# Patient Record
Sex: MALE | Race: WHITE | Employment: FULL TIME | URBAN - METROPOLITAN AREA
[De-identification: names, ages, dates, MRNs, and addresses within clinical notes are randomized per-mention and may not be internally consistent; named-entity substitution may affect disease eponyms.]

---

## 2019-09-20 ENCOUNTER — HOSPITAL ENCOUNTER (OUTPATIENT)
Age: 54
Discharge: HOME | End: 2019-09-22

## 2019-09-20 ENCOUNTER — HOSPITAL ENCOUNTER (OUTPATIENT)
Dept: GENERAL RADIOLOGY | Age: 54
Discharge: HOME | End: 2019-09-22

## 2019-09-20 DIAGNOSIS — Z02.1 PRE-EMPLOYMENT EXAMINATION: ICD-10-CM

## 2019-09-20 PROCEDURE — 72110 X-RAY EXAM L-2 SPINE 4/>VWS: CPT

## 2020-08-13 ENCOUNTER — APPOINTMENT (OUTPATIENT)
Dept: CT IMAGING | Age: 55
End: 2020-08-13
Payer: COMMERCIAL

## 2020-08-13 ENCOUNTER — HOSPITAL ENCOUNTER (EMERGENCY)
Age: 55
Discharge: HOME | End: 2020-08-13
Attending: FAMILY MEDICINE
Payer: COMMERCIAL

## 2020-08-13 ENCOUNTER — APPOINTMENT (OUTPATIENT)
Dept: GENERAL RADIOLOGY | Age: 55
End: 2020-08-13
Payer: COMMERCIAL

## 2020-08-13 ENCOUNTER — TELEPHONE (OUTPATIENT)
Dept: ADMINISTRATIVE | Age: 55
End: 2020-08-13

## 2020-08-13 VITALS
BODY MASS INDEX: 23.49 KG/M2 | TEMPERATURE: 98.6 F | OXYGEN SATURATION: 97 % | DIASTOLIC BLOOD PRESSURE: 78 MMHG | HEART RATE: 83 BPM | WEIGHT: 155 LBS | HEIGHT: 68 IN | SYSTOLIC BLOOD PRESSURE: 123 MMHG | RESPIRATION RATE: 18 BRPM

## 2020-08-13 LAB
ALBUMIN SERPL-MCNC: 4.4 G/DL (ref 3.5–5.2)
ALP BLD-CCNC: 47 U/L (ref 40–129)
ALT SERPL-CCNC: 44 U/L (ref 0–40)
ANION GAP SERPL CALCULATED.3IONS-SCNC: 11 MMOL/L (ref 7–16)
APTT: 30.8 SEC (ref 24.5–35.1)
AST SERPL-CCNC: 47 U/L (ref 0–39)
BILIRUB SERPL-MCNC: 1.7 MG/DL (ref 0–1.2)
BUN BLDV-MCNC: 19 MG/DL (ref 6–20)
CALCIUM SERPL-MCNC: 9.3 MG/DL (ref 8.6–10.2)
CHLORIDE BLD-SCNC: 101 MMOL/L (ref 98–107)
CO2: 27 MMOL/L (ref 22–29)
CREAT SERPL-MCNC: 0.8 MG/DL (ref 0.7–1.2)
GFR AFRICAN AMERICAN: >60
GFR NON-AFRICAN AMERICAN: >60 ML/MIN/1.73
GLUCOSE BLD-MCNC: 112 MG/DL (ref 74–99)
HCT VFR BLD CALC: 39.9 % (ref 37–54)
HEMOGLOBIN: 14.2 G/DL (ref 12.5–16.5)
INR BLD: 1.1
MCH RBC QN AUTO: 31.1 PG (ref 26–35)
MCHC RBC AUTO-ENTMCNC: 35.6 % (ref 32–34.5)
MCV RBC AUTO: 87.5 FL (ref 80–99.9)
PDW BLD-RTO: 12.5 FL (ref 11.5–15)
PLATELET # BLD: 173 E9/L (ref 130–450)
PMV BLD AUTO: 8.3 FL (ref 7–12)
POTASSIUM SERPL-SCNC: 4.2 MMOL/L (ref 3.5–5)
PROTHROMBIN TIME: 12 SEC (ref 9.3–12.4)
RBC # BLD: 4.56 E12/L (ref 3.8–5.8)
SODIUM BLD-SCNC: 139 MMOL/L (ref 132–146)
TOTAL PROTEIN: 6.6 G/DL (ref 6.4–8.3)
WBC # BLD: 7.1 E9/L (ref 4.5–11.5)

## 2020-08-13 PROCEDURE — 99285 EMERGENCY DEPT VISIT HI MDM: CPT

## 2020-08-13 PROCEDURE — 96374 THER/PROPH/DIAG INJ IV PUSH: CPT

## 2020-08-13 PROCEDURE — 99284 EMERGENCY DEPT VISIT MOD MDM: CPT

## 2020-08-13 PROCEDURE — 74176 CT ABD & PELVIS W/O CONTRAST: CPT

## 2020-08-13 PROCEDURE — 6360000002 HC RX W HCPCS

## 2020-08-13 PROCEDURE — 85730 THROMBOPLASTIN TIME PARTIAL: CPT

## 2020-08-13 PROCEDURE — 85027 COMPLETE CBC AUTOMATED: CPT

## 2020-08-13 PROCEDURE — 70450 CT HEAD/BRAIN W/O DYE: CPT

## 2020-08-13 PROCEDURE — 72125 CT NECK SPINE W/O DYE: CPT

## 2020-08-13 PROCEDURE — 71250 CT THORAX DX C-: CPT

## 2020-08-13 PROCEDURE — 80053 COMPREHEN METABOLIC PANEL: CPT

## 2020-08-13 PROCEDURE — 73502 X-RAY EXAM HIP UNI 2-3 VIEWS: CPT

## 2020-08-13 PROCEDURE — 90471 IMMUNIZATION ADMIN: CPT

## 2020-08-13 PROCEDURE — 36415 COLL VENOUS BLD VENIPUNCTURE: CPT

## 2020-08-13 PROCEDURE — 90715 TDAP VACCINE 7 YRS/> IM: CPT

## 2020-08-13 PROCEDURE — 96375 TX/PRO/DX INJ NEW DRUG ADDON: CPT

## 2020-08-13 PROCEDURE — 85610 PROTHROMBIN TIME: CPT

## 2020-08-13 RX ORDER — ONDANSETRON 2 MG/ML
4 INJECTION INTRAMUSCULAR; INTRAVENOUS ONCE
Status: COMPLETED | OUTPATIENT
Start: 2020-08-13 | End: 2020-08-13

## 2020-08-13 RX ORDER — ONDANSETRON 2 MG/ML
INJECTION INTRAMUSCULAR; INTRAVENOUS
Status: COMPLETED
Start: 2020-08-13 | End: 2020-08-13

## 2020-08-13 RX ADMIN — ONDANSETRON 4 MG: 2 INJECTION INTRAMUSCULAR; INTRAVENOUS at 10:05

## 2020-08-13 RX ADMIN — TETANUS TOXOID, REDUCED DIPHTHERIA TOXOID AND ACELLULAR PERTUSSIS VACCINE, ADSORBED 0.5 ML: 5; 2.5; 8; 8; 2.5 SUSPENSION INTRAMUSCULAR at 10:07

## 2020-08-13 RX ADMIN — Medication 1 MG: at 10:06

## 2020-08-13 RX ADMIN — HYDROMORPHONE HYDROCHLORIDE 1 MG: 1 INJECTION, SOLUTION INTRAMUSCULAR; INTRAVENOUS; SUBCUTANEOUS at 10:06

## 2020-08-13 ASSESSMENT — PAIN DESCRIPTION - LOCATION: LOCATION: GENERALIZED

## 2020-08-13 ASSESSMENT — PAIN DESCRIPTION - PROGRESSION: CLINICAL_PROGRESSION: GRADUALLY WORSENING

## 2020-08-13 ASSESSMENT — PAIN SCALES - GENERAL
PAINLEVEL_OUTOF10: 7
PAINLEVEL_OUTOF10: 8
PAINLEVEL_OUTOF10: 8

## 2020-08-13 ASSESSMENT — PAIN DESCRIPTION - PAIN TYPE: TYPE: ACUTE PAIN

## 2020-08-13 ASSESSMENT — PAIN DESCRIPTION - FREQUENCY: FREQUENCY: CONTINUOUS

## 2020-08-13 ASSESSMENT — PAIN DESCRIPTION - ONSET: ONSET: ON-GOING

## 2020-08-13 ASSESSMENT — PAIN DESCRIPTION - ORIENTATION: ORIENTATION: LEFT

## 2020-08-13 ASSESSMENT — PAIN DESCRIPTION - DESCRIPTORS: DESCRIPTORS: SHARP;ACHING

## 2020-08-13 NOTE — TELEPHONE ENCOUNTER
Pt was seen in ED/[facility] today dx: Fracture of left iliac wing, closed, initial encounter.   Pt can be reached at [phone]

## 2020-08-13 NOTE — ED NOTES
Pt ready for discharge.  Dressed himself and transferred to wheelchair     Mary Joaquin RN  08/13/20 2381
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
## 2020-08-13 NOTE — ED NOTES
Attempted to give urine specimen. Still unable to urinate. Assisted back into bed and repositioned for comfort.       Moises Schumacher RN  08/13/20 3240

## 2020-08-13 NOTE — ED NOTES
Left hip abrasion cleaned, Posterior head wound cleansed and examined by Dr Ольга English, RN  08/13/20 0711

## 2020-08-21 ENCOUNTER — HOSPITAL ENCOUNTER (OUTPATIENT)
Dept: GENERAL RADIOLOGY | Age: 55
Discharge: HOME | End: 2020-08-23
Payer: COMMERCIAL

## 2020-08-21 ENCOUNTER — OFFICE VISIT (OUTPATIENT)
Dept: ORTHOPEDIC SURGERY | Age: 55
End: 2020-08-21
Payer: COMMERCIAL

## 2020-08-21 PROCEDURE — 72190 X-RAY EXAM OF PELVIS: CPT

## 2020-08-21 PROCEDURE — 99202 OFFICE O/P NEW SF 15 MIN: CPT

## 2020-08-21 PROCEDURE — 99203 OFFICE O/P NEW LOW 30 MIN: CPT | Performed by: ORTHOPAEDIC SURGERY

## 2020-08-21 RX ORDER — OXYCODONE HYDROCHLORIDE 30 MG/1
30 TABLET, FILM COATED, EXTENDED RELEASE ORAL EVERY 8 HOURS PRN
COMMUNITY
End: 2021-11-11
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
## 2020-09-02 ENCOUNTER — TELEPHONE (OUTPATIENT)
Dept: ORTHOPEDIC SURGERY | Age: 55
End: 2020-09-02

## 2020-09-02 NOTE — TELEPHONE ENCOUNTER
Pt's doctor called in with a referral. Pt is scheduled for 9/25/2020 and they would like for him to get in sooner due to other issues on the referral. Pt can be reached at [phone]. Thank you.

## 2020-09-03 ENCOUNTER — TELEPHONE (OUTPATIENT)
Dept: ORTHOPEDIC SURGERY | Age: 55
End: 2020-09-03

## 2020-09-17 ENCOUNTER — OFFICE VISIT (OUTPATIENT)
Dept: ORTHOPEDIC SURGERY | Age: 55
End: 2020-09-17
Payer: COMMERCIAL

## 2020-09-17 ENCOUNTER — HOSPITAL ENCOUNTER (OUTPATIENT)
Dept: GENERAL RADIOLOGY | Age: 55
Discharge: HOME | End: 2020-09-19
Payer: COMMERCIAL

## 2020-09-17 VITALS — DIASTOLIC BLOOD PRESSURE: 85 MMHG | SYSTOLIC BLOOD PRESSURE: 124 MMHG | HEART RATE: 64 BPM | TEMPERATURE: 97.5 F

## 2020-09-17 PROBLEM — S46.912A LEFT SHOULDER STRAIN: Status: ACTIVE | Noted: 2020-09-17

## 2020-09-17 PROBLEM — S46.911A RIGHT SHOULDER STRAIN: Status: ACTIVE | Noted: 2020-09-17

## 2020-09-17 PROBLEM — S22.32XA CLOSED FRACTURE OF ONE RIB OF LEFT SIDE: Status: ACTIVE | Noted: 2020-09-17

## 2020-09-17 PROBLEM — M23.92 ACUTE INTERNAL DERANGEMENT OF LEFT KNEE: Status: ACTIVE | Noted: 2020-09-17

## 2020-09-17 PROBLEM — S32.312A: Status: ACTIVE | Noted: 2020-09-17

## 2020-09-17 PROCEDURE — 99214 OFFICE O/P EST MOD 30 MIN: CPT | Performed by: ORTHOPAEDIC SURGERY

## 2020-09-17 PROCEDURE — 73030 X-RAY EXAM OF SHOULDER: CPT

## 2020-09-17 PROCEDURE — 73560 X-RAY EXAM OF KNEE 1 OR 2: CPT

## 2020-09-17 PROCEDURE — 99212 OFFICE O/P EST SF 10 MIN: CPT | Performed by: ORTHOPAEDIC SURGERY

## 2020-09-17 PROCEDURE — 72190 X-RAY EXAM OF PELVIS: CPT
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
## 2020-09-27 ENCOUNTER — HOSPITAL ENCOUNTER (OUTPATIENT)
Dept: MRI IMAGING | Age: 55
Discharge: HOME | End: 2020-09-29
Payer: COMMERCIAL

## 2020-09-27 PROCEDURE — 73221 MRI JOINT UPR EXTREM W/O DYE: CPT

## 2020-10-08 ENCOUNTER — OFFICE VISIT (OUTPATIENT)
Dept: ORTHOPEDIC SURGERY | Age: 55
End: 2020-10-08
Payer: COMMERCIAL

## 2020-10-08 VITALS
BODY MASS INDEX: 23.49 KG/M2 | DIASTOLIC BLOOD PRESSURE: 90 MMHG | WEIGHT: 155 LBS | HEART RATE: 60 BPM | TEMPERATURE: 97.2 F | HEIGHT: 68 IN | SYSTOLIC BLOOD PRESSURE: 141 MMHG

## 2020-10-08 PROCEDURE — 99213 OFFICE O/P EST LOW 20 MIN: CPT | Performed by: PHYSICIAN ASSISTANT

## 2020-10-08 PROCEDURE — 99212 OFFICE O/P EST SF 10 MIN: CPT

## 2020-10-08 NOTE — PATIENT INSTRUCTIONS
Continue with your current activity restrictions as you tolerate  C-9 for referral to Dr. Khoa Fine  C-9 for additional diagnoses  [employer]-14 for restrictions     Follow up 2 months for follow up on pelvis injury
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
## 2020-10-22 ENCOUNTER — OFFICE VISIT (OUTPATIENT)
Dept: ORTHOPEDIC SURGERY | Age: 55
End: 2020-10-22
Payer: COMMERCIAL

## 2020-10-22 VITALS — HEIGHT: 68 IN | TEMPERATURE: 98 F | WEIGHT: 160 LBS | BODY MASS INDEX: 24.25 KG/M2

## 2020-10-22 PROCEDURE — 99204 OFFICE O/P NEW MOD 45 MIN: CPT | Performed by: ORTHOPAEDIC SURGERY

## 2020-10-22 RX ORDER — DEXTROAMPHETAMINE SACCHARATE, AMPHETAMINE ASPARTATE, DEXTROAMPHETAMINE SULFATE AND AMPHETAMINE SULFATE 2.5; 2.5; 2.5; 2.5 MG/1; MG/1; MG/1; MG/1
10 TABLET ORAL DAILY
COMMUNITY

## 2020-10-22 NOTE — PROGRESS NOTES
Chief Complaint   Patient presents with    Shoulder Pain     Bilateral Shoulder BWC DOI 8/12/2020, fell off a machine, MRI's done. Luna Guillen is a 47y.o. year old   male who is seen today  for evaluation of bilateral shoulder pain. He reports the pain has been ongoing since 8/12/2020 . He does recall a specific injury which started the pain. bwc injury, fell off a machine. He reports the pain is worse with activity, better with rest.  The patient does have mechanical symptoms. Hedoes have night pain. He denies a feeling of instability. The prior treatments have been ice, heat. The patient   has not responded to the treatment. The patient is right hand dominant. Chief Complaint   Patient presents with    Shoulder Pain     Bilateral Shoulder BWC DOI 8/12/2020, fell off a machine, MRI's done. No past medical history on file. No past surgical history on file. Current Outpatient Medications:     amphetamine-dextroamphetamine (ADDERALL, 10MG,) 10 MG tablet, Take 10 mg by mouth daily. , Disp: , Rfl:     Aspirin-Acetaminophen-Caffeine (EXCEDRIN MIGRAINE PO), Take by mouth, Disp: , Rfl:     oxyCODONE (OXYCONTIN) 30 MG T12A extended release tablet, Take 15 mg by mouth every 12 hours.  , Disp: , Rfl:   Allergies   Allergen Reactions    Iv Dye [Iodides]      Iodine dye     Social History     Socioeconomic History    Marital status:      Spouse name: Not on file    Number of children: Not on file    Years of education: Not on file    Highest education level: Not on file   Occupational History    Not on file   Social Needs    Financial resource strain: Not on file    Food insecurity     Worry: Not on file     Inability: Not on file    Transportation needs     Medical: Not on file     Non-medical: Not on file   Tobacco Use    Smoking status: Former Smoker    Smokeless tobacco: Never Used   Substance and Sexual Activity    Alcohol use: Never    Drug use: Never    Sexual activity: Yes     Partners: Female   Lifestyle    Physical activity     Days per week: Not on file     Minutes per session: Not on file    Stress: Not on file   Relationships    Social connections     Talks on phone: Not on file     Gets together: Not on file     Attends Anabaptism service: Not on file     Active member of club or organization: Not on file     Attends meetings of clubs or organizations: Not on file     Relationship status: Not on file    Intimate partner violence     Fear of current or ex partner: Not on file     Emotionally abused: Not on file     Physically abused: Not on file     Forced sexual activity: Not on file   Other Topics Concern    Not on file   Social History Narrative    Not on file     No family history on file. REVIEW OF SYSTEMS:     General/Constitution:  (-)weight loss, (-)fever, (-)chills, (-)weakness. Skin: (-) rash,(-) psoriasis,(-) eczema, (-)skin cancer. Musculoskeletal: (-) fractures,  (-) dislocations,(-) collagen vascular disease, (-) fibromyalgia, (-) multiple sclerosis, (-) muscular dystrophy, (-) RSD,(-) joint pain (-)swelling, (-) joint pain,swelling. Neurologic: (-) epilepsy, (-)seizures,(-) brain tumor,(-) TIA, (-)stroke, (-)headaches, (-)Parkinson disease,(-) memory loss, (-) LOC. Cardiovascular: (-) Chest pain, (-) swelling in legs/feet, (-) SOB, (-) cramping in legs/feet with walking. Respiratory: (-) SOB, (-) Coughing, (-) night sweats. GI: (-) nausea, (-) vomiting, (-) diarrhea, (-) blood in stool, (-) gastric ulcer. Psychiatric: (-) Depression, (-) Anxiety, (-) bipolar disease, (-) Alzheimer's Disease  Allergic/Immunologic: (-) allergies latex, (-) allergies metal, (-) skin sensitivity.   Hematlogic: (-) anemia, (-) blood transfusion, (-) DVT/PE, (-) Clotting disorders      Subjective:    Constitution:  Temp 98 °F (36.7 °C)   Ht 5' 8\" (1.727 m)   Wt 160 lb (72.6 kg)   BMI 24.33 kg/m²     Psycihatric:  The patient is alert and oriented x 3, appears to be stated age and in no distress. Respiratory:  Respiratory effort is not labored. Patient is not gasping. Palpation of the chest reveals no tactile fremitus. Skin:  Upon inspection: the skin appears warm, dry and intact. There is  a previous scar over the affected area. There is not any cellulitis, lymphedema or cutaneous lesions noted in the lower extremities. Upon palpation there is no induration noted. Neurologic:  Motor exam of the upper extremities show: The reflexes in biceps/triceps/brachioradialis are equal and symmetric. Sensory exam C5-T1 are normal bilaterally. Cardiovascular: The vascular exam is normal and is well perfused to distal extremities. There are 2+ radial pulses bilaterally, and motor and sensation is intact to median, ulnar, and radial, musclocutaneus, and axillary nerve distribution and grossly symmetric bilaterally. There is cap refill noted less than two seconds in all digits. There is not edema of the bilateral upper extremities. There is not varicosities noted in the distal extremities. Lymph:  Upon palpation,  there is no lymphadenopathy noted in bilateral upper extremities. Musculoskeletal:  Gait: normal; examination of the nails and digits reveal no cyanosis or clubbing. Cervical Exam:  On physical exam, Mary Lou Pacheco is well-developed, well-nourished, oriented to person, place and time. his gait is normal.  On evaluation of hiscervical spine, He has full range of motion of the cervical spine without pain. There is no cervical tenderness to palpation. Shoulder Exam:  On evaluation of his bilaterally upper extremities, his bilateral shoulder has no deformity. There is tenderness upon palpation of the greater tuberosity and lateral shoulder. There is not evidence of scapular dyskinesis. There is not muscle atrophy in shoulder girdle.  The range of motion for the Right Shoulder is 100/20/pl and for the Left shoulder is 100/20/pl. Right shoulder Motor strength is 5-/5 in the supraspinatus, 5/5 internal rotation and 5-/5 in external rotation, and Left shoulder motor strength 5-/5 in supraspinatus, 5/5 in internal rotation, 5-/5 in external rotation. Right shoulder:  positive Impingement , positive Barcenas ,negative  Speeds,negative  Apprehension ,negative Faustin Load Shift, negative Tessie manuver, negative Cross arm test.     Left shoulder:  positive Impingement , positive Barcenas ,negative  Speeds,negative  Apprehension ,negative Faustin Load Shift, negative Tessie manuver, negative Cross arm test.     MRI:  Right shoulder:     Full-thickness tear of distal supraspinatus.         Injury without tear of biceps labral anchor.         Degeneration of the glenoid labrum without labral tear.         Acromioclavicular arthropathy which predisposes to rotator cuff    injury. Left shoulder:  Full-thickness tear of the distal supraspinatus.         Enthesopathic change of superolateral humeral head.         Acromioclavicular arthropathy which predisposes to rotator cuff tear.         Partial intrasubstance tear of the biceps labral anchor.         Degeneration without tear of the glenoid labrum. Radiographic findings reviewed with patient    Impression:   Encounter Diagnoses   Name Primary?  Traumatic complete tear of left rotator cuff, initial encounter Yes    Shoulder impingement, right     Shoulder impingement, left     Traumatic complete tear of right rotator cuff, initial encounter        Plan: Natural history and expected course discussed. Questions answered. Educational material distributed. Reduction in offending activity. Gentle ROM exercises  RICE therapy.   Add bilateral rotator cuff tear and impingementto allowed conditions  C9 for bilateral shoulder arthroscopy, rotator cuff repair, sad and debridement, pre op medical clearance, post op PT  Fu when allowed

## 2020-11-16 ENCOUNTER — OFFICE VISIT (OUTPATIENT)
Dept: ORTHOPEDIC SURGERY | Age: 55
End: 2020-11-16
Payer: COMMERCIAL

## 2020-11-16 VITALS — WEIGHT: 160 LBS | HEIGHT: 68 IN | BODY MASS INDEX: 24.25 KG/M2

## 2020-11-16 PROCEDURE — 99214 OFFICE O/P EST MOD 30 MIN: CPT | Performed by: ORTHOPAEDIC SURGERY

## 2020-11-16 NOTE — PROGRESS NOTES
Chief Complaint   Patient presents with    Shoulder Pain     Bilateral shoulder WC followup        Ressizulema Saldivar is a 47y.o. year old   male who is seen today  for evaluation of bilateral shoulder pain. He reports the pain has been ongoing since 8/12/2020 . He does recall a specific injury which started the pain. bwc injury, fell off a machine. He reports the pain is worse with activity, better with rest.  The patient does have mechanical symptoms. Hedoes have night pain. He denies a feeling of instability. The prior treatments have been ice, heat. The patient   has not responded to the treatment. The patient is right hand dominant. Per patient, [facility] surgery was approved. Chief Complaint   Patient presents with    Shoulder Pain     Bilateral shoulder WC followup     No past medical history on file. No past surgical history on file. Current Outpatient Medications:     amphetamine-dextroamphetamine (ADDERALL, 10MG,) 10 MG tablet, Take 10 mg by mouth daily. , Disp: , Rfl:     Aspirin-Acetaminophen-Caffeine (EXCEDRIN MIGRAINE PO), Take by mouth, Disp: , Rfl:     oxyCODONE (OXYCONTIN) 30 MG T12A extended release tablet, Take 15 mg by mouth every 12 hours.  , Disp: , Rfl:   Allergies   Allergen Reactions    Iv Dye [Iodides]      Iodine dye     Social History     Socioeconomic History    Marital status:      Spouse name: Not on file    Number of children: Not on file    Years of education: Not on file    Highest education level: Not on file   Occupational History    Not on file   Social Needs    Financial resource strain: Not on file    Food insecurity     Worry: Not on file     Inability: Not on file    Transportation needs     Medical: Not on file     Non-medical: Not on file   Tobacco Use    Smoking status: Former Smoker    Smokeless tobacco: Never Used   Substance and Sexual Activity    Alcohol use: Never    Drug use: Never    Sexual activity: Yes     Partners: Female Lifestyle    Physical activity     Days per week: Not on file     Minutes per session: Not on file    Stress: Not on file   Relationships    Social connections     Talks on phone: Not on file     Gets together: Not on file     Attends [employer] service: Not on file     Active member of club or organization: Not on file     Attends meetings of clubs or organizations: Not on file     Relationship status: Not on file    Intimate partner violence     Fear of current or ex partner: Not on file     Emotionally abused: Not on file     Physically abused: Not on file     Forced sexual activity: Not on file   Other Topics Concern    Not on file   Social History Narrative    Not on file     No family history on file. REVIEW OF SYSTEMS:     General/Constitution:  (-)weight loss, (-)fever, (-)chills, (-)weakness. Skin: (-) rash,(-) psoriasis,(-) eczema, (-)skin cancer. Musculoskeletal: (-) fractures,  (-) dislocations,(-) collagen vascular disease, (-) fibromyalgia, (-) multiple sclerosis, (-) muscular dystrophy, (-) RSD,(-) joint pain (-)swelling, (-) joint pain,swelling. Neurologic: (-) epilepsy, (-)seizures,(-) brain tumor,(-) TIA, (-)stroke, (-)headaches, (-)Parkinson disease,(-) memory loss, (-) LOC. Cardiovascular: (-) Chest pain, (-) swelling in legs/feet, (-) SOB, (-) cramping in legs/feet with walking. Respiratory: (-) SOB, (-) Coughing, (-) night sweats. GI: (-) nausea, (-) vomiting, (-) diarrhea, (-) blood in stool, (-) gastric ulcer. Psychiatric: (-) Depression, (-) Anxiety, (-) bipolar disease, (-) Alzheimer's Disease  Allergic/Immunologic: (-) allergies latex, (-) allergies metal, (-) skin sensitivity. Hematlogic: (-) anemia, (-) blood transfusion, (-) DVT/PE, (-) Clotting disorders      Subjective:  _Ht 5' 8\" (1.727 m)   Wt 160 lb (72.6 kg)   BMI 24.33 kg/m²  Vital signs are stable. In general, patient is awake, alert and oriented X3, in no apparent distress.   Examination of HENT reveals normocephalic, atraumatic. PERRLA/EOMI sclera are white. Conjunctivae are clear. TM's are intact. Pharynx is pink and moist.  Uvula and tongue are midline. Heart: Positive S1 and positive S2 with regular rate and rhythm. Lungs: Clear to auscultation bilaterally without rales, rhonchi or wheezes. Abdomen: soft, nontender. Positive bowel sounds. No organomegaly. No guarding or rigidity. Constitution:  Ht 5' 8\" (1.727 m)   Wt 160 lb (72.6 kg)   BMI 24.33 kg/m²     Psycihatric:  The patient is alert and oriented x 3, appears to be stated age and in no distress. Respiratory:  Respiratory effort is not labored. Patient is not gasping. Palpation of the chest reveals no tactile fremitus. Skin:  Upon inspection: the skin appears warm, dry and intact. There is  a previous scar over the affected area. There is not any cellulitis, lymphedema or cutaneous lesions noted in the lower extremities. Upon palpation there is no induration noted. Neurologic:  Motor exam of the upper extremities show: The reflexes in biceps/triceps/brachioradialis are equal and symmetric. Sensory exam C5-T1 are normal bilaterally. Cardiovascular: The vascular exam is normal and is well perfused to distal extremities. There are 2+ radial pulses bilaterally, and motor and sensation is intact to median, ulnar, and radial, musclocutaneus, and axillary nerve distribution and grossly symmetric bilaterally. There is cap refill noted less than two seconds in all digits. There is not edema of the bilateral upper extremities. There is not varicosities noted in the distal extremities. Lymph:  Upon palpation,  there is no lymphadenopathy noted in bilateral upper extremities. Musculoskeletal:  Gait: normal; examination of the nails and digits reveal no cyanosis or clubbing. Cervical Exam:  On physical exam, Mario Pacheco is well-developed, well-nourished, oriented to person, place and time.   his gait is normal. On evaluation of hiscervical spine, He has full range of motion of the cervical spine without pain. There is no cervical tenderness to palpation. Shoulder Exam:  On evaluation of his bilaterally upper extremities, his bilateral shoulder has no deformity. There is tenderness upon palpation of the greater tuberosity and lateral shoulder. There is not evidence of scapular dyskinesis. There is not muscle atrophy in shoulder girdle. The range of motion for the Right Shoulder is 100/20/pl and for the Left shoulder is 100/20/pl. Right shoulder Motor strength is 5-/5 in the supraspinatus, 5/5 internal rotation and 5-/5 in external rotation, and Left shoulder motor strength 5-/5 in supraspinatus, 5/5 in internal rotation, 5-/5 in external rotation. Right shoulder:  positive Impingement , positive Barcenas ,negative  Speeds,negative  Apprehension ,negative Faustin Load Shift, negative Tessie manuver, negative Cross arm test.     Left shoulder:  positive Impingement , positive Barcenas ,negative  Speeds,negative  Apprehension ,negative Faustin Load Shift, negative Tessie manuver, negative Cross arm test.     MRI:  Right shoulder:     Full-thickness tear of distal supraspinatus.         Injury without tear of biceps labral anchor.         Degeneration of the glenoid labrum without labral tear.         Acromioclavicular arthropathy which predisposes to rotator cuff    injury. Left shoulder:  Full-thickness tear of the distal supraspinatus.         Enthesopathic change of superolateral humeral head.         Acromioclavicular arthropathy which predisposes to rotator cuff tear.         Partial intrasubstance tear of the biceps labral anchor.         Degeneration without tear of the glenoid labrum. Radiographic findings reviewed with patient    Impression:   Encounter Diagnosis   Name Primary?     Traumatic complete tear of left rotator cuff, initial encounter Yes       Plan: Natural history and expected course discussed. Questions answered. Educational material distributed. Reduction in offending activity. Gentle ROM exercises  RICE therapy. Patient will undergo Right shoulder arthroscopy with rotator cuff repair, debridement and subacromial decompression 12/4/2020  The risks and benefits were reviewed with the patient such as: arthorfibrosis shoulder, DVT, infection,  injuries to blood vessels and nerves, non relief of symptoms, recurrent tear, continued pain, worsening of symptoms. At least 25 minutes was spent discussing the diagnosis and treatment options with the patient with at least 50% of the time was spent with decision making and counseling the patient. The patient was counseled at length about the risks of mariano Covid-19 during their perioperative period and any recovery window from their procedure. The patient was made aware that mariano Covid-19  may worsen their prognosis for recovering from their procedure  and lend to a higher morbidity and/or mortality risk. All material risks, benefits, and reasonable alternatives including postponing the procedure were discussed. The patient does wish to proceed with the procedure at this time.

## 2020-11-27 ENCOUNTER — HOSPITAL ENCOUNTER (OUTPATIENT)
Age: 55
Discharge: HOME | End: 2020-11-29
Payer: COMMERCIAL

## 2020-11-27 PROCEDURE — U0003 INFECTIOUS AGENT DETECTION BY NUCLEIC ACID (DNA OR RNA); SEVERE ACUTE RESPIRATORY SYNDROME CORONAVIRUS 2 (SARS-COV-2) (CORONAVIRUS DISEASE [COVID-19]), AMPLIFIED PROBE TECHNIQUE, MAKING USE OF HIGH THROUGHPUT TECHNOLOGIES AS DESCRIBED BY CMS-2020-01-R: HCPCS

## 2020-11-29 LAB
SARS-COV-2: NOT DETECTED
SOURCE: NORMAL

## 2020-12-03 ENCOUNTER — ANESTHESIA EVENT (OUTPATIENT)
Dept: OPERATING ROOM | Age: 55
End: 2020-12-03
Payer: COMMERCIAL

## 2020-12-03 NOTE — ANESTHESIA PRE PROCEDURE
Department of Anesthesiology  Preprocedure Note       Name:  Mirta Landis   Age:  47 y.o.  :  1965                                          MRN:  [id number]         Date:  2020      Surgeon: Cassie Loera): Sidra Poe DO    Procedure: Procedure(s):  RIGHT SHOULDER ARTHROSCOPY, ROTATOR CUFF REPAIR, SUBACROMIAL DECOMPRESSION (CPT 77480)    Medications prior to admission:   Prior to Admission medications    Medication Sig Start Date End Date Taking? Authorizing Provider   cephALEXin (KEFLEX) 500 MG capsule Take 1 capsule by mouth 3 times daily for 10 doses 20 Yes Sidra Poe DO   amphetamine-dextroamphetamine (ADDERALL, 10MG,) 10 MG tablet Take 10 mg by mouth daily. Yes Historical Provider, MD   oxyCODONE (OXYCONTIN) 30 MG T12A extended release tablet Take 30 mg by mouth every 8 hours as needed. Historical Provider, MD       Current medications:    Current Facility-Administered Medications   Medication Dose Route Frequency Provider Last Rate Last Dose    ceFAZolin (ANCEF) 2 g in dextrose 3 % 50 mL IVPB (duplex)  2 g Intravenous Once Sidra Poe DO        lactated ringers infusion   Intravenous Continuous Zeke Giang  mL/hr at 20 0917         Allergies:     Allergies   Allergen Reactions    Iv Dye [Iodides] Anaphylaxis     Iodine dye       Problem List:    Patient Active Problem List   Diagnosis Code    Closed displaced avulsion fracture of left ilium ([facility] [address] S32.312A    Acute internal derangement of left knee M23.92    Left shoulder strain S46.912A    Right shoulder strain S46.911A    Closed fracture of one rib of left side S22.32XA    Complete rotator cuff tear or rupture of right shoulder, not specified as traumatic M75.121    Impingement syndrome of right shoulder M75.41       Past Medical History:        Diagnosis Date    ADHD     Kidney stones     Work related injury 2020    BILAT torn roatator cuffs ,chest contusion        Past Surgical History:        Procedure Laterality Date    COLONOSCOPY      FRACTURE SURGERY      FOOT    KNEE SURGERY Bilateral        Social History:    Social History     Tobacco Use    Smoking status: Former Smoker     Years: 10.00     Types: Cigarettes    Smokeless tobacco: Never Used   Substance Use Topics    Alcohol use: Never                                Counseling given: Not Answered      Vital Signs (Current):   Vitals:    11/24/20 0911 12/04/20 0856   BP:  107/80   Pulse:  67   Resp:  20   Temp:  97.4 °F (36.3 °C)   TempSrc:  Temporal   SpO2:  98%   Weight: 160 lb (72.6 kg) 156 lb (70.8 kg)   Height: 5' 8\" (1.727 m) 5' 8\" (1.727 m)                                              BP Readings from Last 3 Encounters:   12/04/20 107/80   10/08/20 (!) 141/90   09/17/20 124/85       NPO Status: Time of last liquid consumption: 2100                        Time of last solid consumption: 1800                        Date of last liquid consumption: 12/03/20                        Date of last solid food consumption: 12/03/20    BMI:   Wt Readings from Last 3 Encounters:   12/04/20 156 lb (70.8 kg)   11/16/20 160 lb (72.6 kg)   10/22/20 160 lb (72.6 kg)     Body mass index is 23.72 kg/m². CBC:   Lab Results   Component Value Date    WBC 7.1 08/13/2020    RBC 4.56 08/13/2020    HGB 14.2 08/13/2020    HCT 39.9 08/13/2020    MCV 87.5 08/13/2020    RDW 12.5 08/13/2020     08/13/2020       CMP:   Lab Results   Component Value Date     08/13/2020    K 4.2 08/13/2020     08/13/2020    CO2 27 08/13/2020    BUN 19 08/13/2020    CREATININE 0.8 08/13/2020    GFRAA >60 08/13/2020    LABGLOM >60 08/13/2020    GLUCOSE 112 08/13/2020    PROT 6.6 08/13/2020    CALCIUM 9.3 08/13/2020    BILITOT 1.7 08/13/2020    ALKPHOS 47 08/13/2020    AST 47 08/13/2020    ALT 44 08/13/2020       POC Tests: No results for input(s): POCGLU, POCNA, POCK, POCCL, POCBUN, POCHEMO, POCHCT in the last 72 hours.     Coags:   Lab Results   Component Value Date    PROTIME 12.0 08/13/2020    INR 1.1 08/13/2020    APTT 30.8 08/13/2020       HCG (If Applicable): No results found for: PREGTESTUR, PREGSERUM, HCG, HCGQUANT     ABGs: No results found for: PHART, PO2ART, DJH9IHJ, DRL6QPU, BEART, R3ABWORD     Type & Screen (If Applicable):  No results found for: LABABO, LABRH    Drug/Infectious Status (If Applicable):  No results found for: HIV, HEPCAB    COVID-19 Screening (If Applicable):   Lab Results   Component Value Date    COVID19 Not Detected 11/27/2020         Anesthesia Evaluation  Patient summary reviewed no history of anesthetic complications:   Airway: Mallampati: I  TM distance: >3 FB   Neck ROM: full  Mouth opening: > = 3 FB Dental: normal exam         Pulmonary:Negative Pulmonary ROS breath sounds clear to auscultation                             Cardiovascular:Negative CV ROS            Rhythm: regular  Rate: normal                    Neuro/Psych:   (+) psychiatric history:            GI/Hepatic/Renal:   (+) renal disease: kidney stones,      (-) no morbid obesity       Endo/Other: Negative Endo/Other ROS                    Abdominal:         (-) obese     Vascular: negative vascular ROS. Anesthesia Plan      general and regional     ASA 2     (Covid negative 11/27. Needs labs ekg. Right interscalene nerve block for post op pain control)  Induction: intravenous. MIPS: Postoperative opioids intended and Prophylactic antiemetics administered. Plan discussed with CRNA. PAT Chart Review:  Chart reviewed per routine on December 3, 2020 at 8:07 AM by Bandar Evangelista MD.  (Final assessment and plan per day of surgery team.)    DOS STAFF ADDENDUM:    Pt seen and examined, chart reviewed (including anesthesia, drug and allergy history). Anesthetic plan, risks, benefits, alternatives, and personnel involved discussed with patient.   Patient verbalized an understanding and agrees to proceed. Plan discussed with care team members and agreed upon.     Hines Osler, MD  Staff Anesthesiologist  9:37 AM  Hines Osler, MD   12/4/2020

## 2020-12-04 ENCOUNTER — ANESTHESIA (OUTPATIENT)
Dept: OPERATING ROOM | Age: 55
End: 2020-12-04
Payer: COMMERCIAL

## 2020-12-04 ENCOUNTER — HOSPITAL ENCOUNTER (OUTPATIENT)
Age: 55
Setting detail: OUTPATIENT SURGERY
Discharge: HOME | End: 2020-12-04
Attending: ORTHOPAEDIC SURGERY | Admitting: ORTHOPAEDIC SURGERY
Payer: COMMERCIAL

## 2020-12-04 VITALS
DIASTOLIC BLOOD PRESSURE: 92 MMHG | SYSTOLIC BLOOD PRESSURE: 140 MMHG | RESPIRATION RATE: 27 BRPM | TEMPERATURE: 95 F | OXYGEN SATURATION: 100 %

## 2020-12-04 VITALS
HEART RATE: 64 BPM | DIASTOLIC BLOOD PRESSURE: 84 MMHG | OXYGEN SATURATION: 98 % | TEMPERATURE: 98 F | WEIGHT: 156 LBS | HEIGHT: 68 IN | BODY MASS INDEX: 23.64 KG/M2 | SYSTOLIC BLOOD PRESSURE: 136 MMHG | RESPIRATION RATE: 16 BRPM

## 2020-12-04 PROBLEM — M75.121 COMPLETE ROTATOR CUFF TEAR OR RUPTURE OF RIGHT SHOULDER, NOT SPECIFIED AS TRAUMATIC: Status: ACTIVE | Noted: 2020-12-04

## 2020-12-04 PROBLEM — M75.41 IMPINGEMENT SYNDROME OF RIGHT SHOULDER: Status: ACTIVE | Noted: 2020-12-04

## 2020-12-04 PROCEDURE — 3700000001 HC ADD 15 MINUTES (ANESTHESIA): Performed by: ORTHOPAEDIC SURGERY

## 2020-12-04 PROCEDURE — C1713 ANCHOR/SCREW BN/BN,TIS/BN: HCPCS | Performed by: ORTHOPAEDIC SURGERY

## 2020-12-04 PROCEDURE — 7100000000 HC PACU RECOVERY - FIRST 15 MIN: Performed by: ORTHOPAEDIC SURGERY

## 2020-12-04 PROCEDURE — 7100000010 HC PHASE II RECOVERY - FIRST 15 MIN: Performed by: ORTHOPAEDIC SURGERY

## 2020-12-04 PROCEDURE — 3600000004 HC SURGERY LEVEL 4 BASE: Performed by: ORTHOPAEDIC SURGERY

## 2020-12-04 PROCEDURE — 29826 SHO ARTHRS SRG DECOMPRESSION: CPT | Performed by: ORTHOPAEDIC SURGERY

## 2020-12-04 PROCEDURE — 2580000003 HC RX 258: Performed by: ANESTHESIOLOGY

## 2020-12-04 PROCEDURE — 64415 NJX AA&/STRD BRCH PLXS IMG: CPT | Performed by: ANESTHESIOLOGY

## 2020-12-04 PROCEDURE — 3700000000 HC ANESTHESIA ATTENDED CARE: Performed by: ORTHOPAEDIC SURGERY

## 2020-12-04 PROCEDURE — 6360000002 HC RX W HCPCS: Performed by: ANESTHESIOLOGY

## 2020-12-04 PROCEDURE — 2500000003 HC RX 250 WO HCPCS: Performed by: NURSE ANESTHETIST, CERTIFIED REGISTERED

## 2020-12-04 PROCEDURE — 6360000002 HC RX W HCPCS: Performed by: ORTHOPAEDIC SURGERY

## 2020-12-04 PROCEDURE — 29828 SHO ARTHRS SRG BICP TENODSIS: CPT | Performed by: ORTHOPAEDIC SURGERY

## 2020-12-04 PROCEDURE — 6360000002 HC RX W HCPCS: Performed by: NURSE ANESTHETIST, CERTIFIED REGISTERED

## 2020-12-04 PROCEDURE — 2580000003 HC RX 258: Performed by: ORTHOPAEDIC SURGERY

## 2020-12-04 PROCEDURE — 29827 SHO ARTHRS SRG RT8TR CUF RPR: CPT | Performed by: ORTHOPAEDIC SURGERY

## 2020-12-04 PROCEDURE — 7100000011 HC PHASE II RECOVERY - ADDTL 15 MIN: Performed by: ORTHOPAEDIC SURGERY

## 2020-12-04 PROCEDURE — 2709999900 HC NON-CHARGEABLE SUPPLY: Performed by: ORTHOPAEDIC SURGERY

## 2020-12-04 PROCEDURE — 7100000001 HC PACU RECOVERY - ADDTL 15 MIN: Performed by: ORTHOPAEDIC SURGERY

## 2020-12-04 PROCEDURE — 3600000014 HC SURGERY LEVEL 4 ADDTL 15MIN: Performed by: ORTHOPAEDIC SURGERY

## 2020-12-04 PROCEDURE — 6360000002 HC RX W HCPCS

## 2020-12-04 PROCEDURE — 2720000010 HC SURG SUPPLY STERILE: Performed by: ORTHOPAEDIC SURGERY

## 2020-12-04 DEVICE — ANCHOR SUT L24.5MM DIA4.75MM BIOCOMPOSITE SELF PUNCHING: Type: IMPLANTABLE DEVICE | Site: SHOULDER | Status: FUNCTIONAL

## 2020-12-04 DEVICE — ANCHOR SUT L19.1MM DIA4.75MM BIOCOMPOSITE FULL THRD: Type: IMPLANTABLE DEVICE | Site: SHOULDER | Status: FUNCTIONAL

## 2020-12-04 RX ORDER — SODIUM CHLORIDE, SODIUM LACTATE, POTASSIUM CHLORIDE, CALCIUM CHLORIDE 600; 310; 30; 20 MG/100ML; MG/100ML; MG/100ML; MG/100ML
INJECTION, SOLUTION INTRAVENOUS CONTINUOUS
Status: DISCONTINUED | OUTPATIENT
Start: 2020-12-04 | End: 2020-12-04 | Stop reason: HOSPADM

## 2020-12-04 RX ORDER — MIDAZOLAM HYDROCHLORIDE 1 MG/ML
INJECTION INTRAMUSCULAR; INTRAVENOUS PRN
Status: DISCONTINUED | OUTPATIENT
Start: 2020-12-04 | End: 2020-12-04 | Stop reason: SDUPTHER

## 2020-12-04 RX ORDER — LIDOCAINE HYDROCHLORIDE 20 MG/ML
INJECTION, SOLUTION INTRAVENOUS PRN
Status: DISCONTINUED | OUTPATIENT
Start: 2020-12-04 | End: 2020-12-04 | Stop reason: SDUPTHER

## 2020-12-04 RX ORDER — FENTANYL CITRATE 50 UG/ML
25 INJECTION, SOLUTION INTRAMUSCULAR; INTRAVENOUS EVERY 5 MIN PRN
Status: DISCONTINUED | OUTPATIENT
Start: 2020-12-04 | End: 2020-12-04 | Stop reason: HOSPADM

## 2020-12-04 RX ORDER — CEPHALEXIN 500 MG/1
500 CAPSULE ORAL 3 TIMES DAILY
Qty: 10 CAPSULE | Refills: 0 | Status: SHIPPED | OUTPATIENT
Start: 2020-12-04 | End: 2020-12-08

## 2020-12-04 RX ORDER — KETOROLAC TROMETHAMINE 30 MG/ML
INJECTION, SOLUTION INTRAMUSCULAR; INTRAVENOUS PRN
Status: DISCONTINUED | OUTPATIENT
Start: 2020-12-04 | End: 2020-12-04 | Stop reason: SDUPTHER

## 2020-12-04 RX ORDER — GLYCOPYRROLATE 1 MG/5 ML
SYRINGE (ML) INTRAVENOUS PRN
Status: DISCONTINUED | OUTPATIENT
Start: 2020-12-04 | End: 2020-12-04 | Stop reason: SDUPTHER

## 2020-12-04 RX ORDER — NEOSTIGMINE METHYLSULFATE 1 MG/ML
INJECTION, SOLUTION INTRAVENOUS PRN
Status: DISCONTINUED | OUTPATIENT
Start: 2020-12-04 | End: 2020-12-04 | Stop reason: SDUPTHER

## 2020-12-04 RX ORDER — OXYCODONE HYDROCHLORIDE AND ACETAMINOPHEN 5; 325 MG/1; MG/1
1 TABLET ORAL PRN
Status: DISCONTINUED | OUTPATIENT
Start: 2020-12-04 | End: 2020-12-04 | Stop reason: HOSPADM

## 2020-12-04 RX ORDER — DIPHENHYDRAMINE HYDROCHLORIDE 50 MG/ML
INJECTION INTRAMUSCULAR; INTRAVENOUS PRN
Status: DISCONTINUED | OUTPATIENT
Start: 2020-12-04 | End: 2020-12-04 | Stop reason: SDUPTHER

## 2020-12-04 RX ORDER — MEPERIDINE HYDROCHLORIDE 25 MG/ML
12.5 INJECTION INTRAMUSCULAR; INTRAVENOUS; SUBCUTANEOUS EVERY 5 MIN PRN
Status: DISCONTINUED | OUTPATIENT
Start: 2020-12-04 | End: 2020-12-04 | Stop reason: HOSPADM

## 2020-12-04 RX ORDER — OXYCODONE HYDROCHLORIDE AND ACETAMINOPHEN 5; 325 MG/1; MG/1
2 TABLET ORAL PRN
Status: DISCONTINUED | OUTPATIENT
Start: 2020-12-04 | End: 2020-12-04 | Stop reason: HOSPADM

## 2020-12-04 RX ORDER — ROPIVACAINE HYDROCHLORIDE 5 MG/ML
INJECTION, SOLUTION EPIDURAL; INFILTRATION; PERINEURAL PRN
Status: DISCONTINUED | OUTPATIENT
Start: 2020-12-04 | End: 2020-12-04 | Stop reason: SDUPTHER

## 2020-12-04 RX ORDER — PROMETHAZINE HYDROCHLORIDE 25 MG/ML
6.25 INJECTION, SOLUTION INTRAMUSCULAR; INTRAVENOUS
Status: DISCONTINUED | OUTPATIENT
Start: 2020-12-04 | End: 2020-12-04 | Stop reason: HOSPADM

## 2020-12-04 RX ORDER — MORPHINE SULFATE 2 MG/ML
2 INJECTION, SOLUTION INTRAMUSCULAR; INTRAVENOUS EVERY 5 MIN PRN
Status: DISCONTINUED | OUTPATIENT
Start: 2020-12-04 | End: 2020-12-04 | Stop reason: HOSPADM

## 2020-12-04 RX ORDER — CEFAZOLIN SODIUM 2 G/50ML
2 SOLUTION INTRAVENOUS ONCE
Status: COMPLETED | OUTPATIENT
Start: 2020-12-04 | End: 2020-12-04

## 2020-12-04 RX ORDER — ROCURONIUM BROMIDE 10 MG/ML
INJECTION, SOLUTION INTRAVENOUS PRN
Status: DISCONTINUED | OUTPATIENT
Start: 2020-12-04 | End: 2020-12-04 | Stop reason: SDUPTHER

## 2020-12-04 RX ORDER — ROPIVACAINE HYDROCHLORIDE 5 MG/ML
INJECTION, SOLUTION EPIDURAL; INFILTRATION; PERINEURAL
Status: DISCONTINUED | OUTPATIENT
Start: 2020-12-04 | End: 2020-12-04 | Stop reason: SDUPTHER

## 2020-12-04 RX ORDER — FENTANYL CITRATE 50 UG/ML
INJECTION, SOLUTION INTRAMUSCULAR; INTRAVENOUS PRN
Status: DISCONTINUED | OUTPATIENT
Start: 2020-12-04 | End: 2020-12-04 | Stop reason: SDUPTHER

## 2020-12-04 RX ORDER — PROPOFOL 10 MG/ML
INJECTION, EMULSION INTRAVENOUS PRN
Status: DISCONTINUED | OUTPATIENT
Start: 2020-12-04 | End: 2020-12-04 | Stop reason: SDUPTHER

## 2020-12-04 RX ADMIN — FENTANYL CITRATE 50 MCG: 50 INJECTION, SOLUTION INTRAMUSCULAR; INTRAVENOUS at 10:09

## 2020-12-04 RX ADMIN — ROPIVACAINE HYDROCHLORIDE 30 ML: 5 INJECTION, SOLUTION EPIDURAL; INFILTRATION; PERINEURAL at 10:23

## 2020-12-04 RX ADMIN — FENTANYL CITRATE 50 MCG: 50 INJECTION, SOLUTION INTRAMUSCULAR; INTRAVENOUS at 10:16

## 2020-12-04 RX ADMIN — FENTANYL CITRATE 50 MCG: 50 INJECTION, SOLUTION INTRAMUSCULAR; INTRAVENOUS at 10:11

## 2020-12-04 RX ADMIN — HYDROMORPHONE HYDROCHLORIDE 0.5 MG: 1 INJECTION, SOLUTION INTRAMUSCULAR; INTRAVENOUS; SUBCUTANEOUS at 13:09

## 2020-12-04 RX ADMIN — ROPIVACAINE HYDROCHLORIDE 30 ML: 5 INJECTION, SOLUTION EPIDURAL; INFILTRATION; PERINEURAL at 10:20

## 2020-12-04 RX ADMIN — SODIUM CHLORIDE, POTASSIUM CHLORIDE, SODIUM LACTATE AND CALCIUM CHLORIDE: 600; 310; 30; 20 INJECTION, SOLUTION INTRAVENOUS at 11:14

## 2020-12-04 RX ADMIN — FENTANYL CITRATE 50 MCG: 50 INJECTION, SOLUTION INTRAMUSCULAR; INTRAVENOUS at 10:14

## 2020-12-04 RX ADMIN — Medication 3 MG: at 12:00

## 2020-12-04 RX ADMIN — KETOROLAC TROMETHAMINE 30 MG: 30 INJECTION, SOLUTION INTRAMUSCULAR; INTRAVENOUS at 12:00

## 2020-12-04 RX ADMIN — DIPHENHYDRAMINE HYDROCHLORIDE 12.5 MG: 50 INJECTION, SOLUTION INTRAMUSCULAR; INTRAVENOUS at 11:55

## 2020-12-04 RX ADMIN — ROCURONIUM BROMIDE 40 MG: 10 INJECTION, SOLUTION INTRAVENOUS at 10:28

## 2020-12-04 RX ADMIN — HYDROMORPHONE HYDROCHLORIDE 0.5 MG: 1 INJECTION, SOLUTION INTRAMUSCULAR; INTRAVENOUS; SUBCUTANEOUS at 12:33

## 2020-12-04 RX ADMIN — PROPOFOL 200 MG: 10 INJECTION, EMULSION INTRAVENOUS at 10:28

## 2020-12-04 RX ADMIN — FENTANYL CITRATE 50 MCG: 50 INJECTION, SOLUTION INTRAMUSCULAR; INTRAVENOUS at 10:28

## 2020-12-04 RX ADMIN — CEFAZOLIN SODIUM 2 G: 2 SOLUTION INTRAVENOUS at 10:16

## 2020-12-04 RX ADMIN — Medication 0.6 MG: at 12:00

## 2020-12-04 RX ADMIN — MIDAZOLAM 2 MG: 1 INJECTION INTRAMUSCULAR; INTRAVENOUS at 10:07

## 2020-12-04 RX ADMIN — SODIUM CHLORIDE, POTASSIUM CHLORIDE, SODIUM LACTATE AND CALCIUM CHLORIDE: 600; 310; 30; 20 INJECTION, SOLUTION INTRAVENOUS at 09:17

## 2020-12-04 RX ADMIN — LIDOCAINE HYDROCHLORIDE 50 MG: 20 INJECTION, SOLUTION INTRAVENOUS at 10:28

## 2020-12-04 RX ADMIN — ROCURONIUM BROMIDE 5 MG: 10 INJECTION, SOLUTION INTRAVENOUS at 11:37

## 2020-12-04 ASSESSMENT — PULMONARY FUNCTION TESTS
PIF_VALUE: 1
PIF_VALUE: 14
PIF_VALUE: 12
PIF_VALUE: 12
PIF_VALUE: 14
PIF_VALUE: 14
PIF_VALUE: 12
PIF_VALUE: 11
PIF_VALUE: 10
PIF_VALUE: 11
PIF_VALUE: 1
PIF_VALUE: 14
PIF_VALUE: 12
PIF_VALUE: 0
PIF_VALUE: 9
PIF_VALUE: 2
PIF_VALUE: 14
PIF_VALUE: 9
PIF_VALUE: 11
PIF_VALUE: 12
PIF_VALUE: 14
PIF_VALUE: 12
PIF_VALUE: 12
PIF_VALUE: 1
PIF_VALUE: 14
PIF_VALUE: 9
PIF_VALUE: 12
PIF_VALUE: 14
PIF_VALUE: 15
PIF_VALUE: 0
PIF_VALUE: 14
PIF_VALUE: 12
PIF_VALUE: 14
PIF_VALUE: 14
PIF_VALUE: 12
PIF_VALUE: 11
PIF_VALUE: 11
PIF_VALUE: 13
PIF_VALUE: 9
PIF_VALUE: 9
PIF_VALUE: 10
PIF_VALUE: 4
PIF_VALUE: 1
PIF_VALUE: 8
PIF_VALUE: 10
PIF_VALUE: 1
PIF_VALUE: 20
PIF_VALUE: 11
PIF_VALUE: 11
PIF_VALUE: 1
PIF_VALUE: 1
PIF_VALUE: 12
PIF_VALUE: 1
PIF_VALUE: 11
PIF_VALUE: 14
PIF_VALUE: 1
PIF_VALUE: 14
PIF_VALUE: 25
PIF_VALUE: 10
PIF_VALUE: 26
PIF_VALUE: 2
PIF_VALUE: 1
PIF_VALUE: 15
PIF_VALUE: 14
PIF_VALUE: 1
PIF_VALUE: 14
PIF_VALUE: 12
PIF_VALUE: 9
PIF_VALUE: 18
PIF_VALUE: 9
PIF_VALUE: 21
PIF_VALUE: 11
PIF_VALUE: 12
PIF_VALUE: 14
PIF_VALUE: 18
PIF_VALUE: 12
PIF_VALUE: 24
PIF_VALUE: 12
PIF_VALUE: 1
PIF_VALUE: 1
PIF_VALUE: 2
PIF_VALUE: 1
PIF_VALUE: 14
PIF_VALUE: 11
PIF_VALUE: 14
PIF_VALUE: 14
PIF_VALUE: 10
PIF_VALUE: 14
PIF_VALUE: 1
PIF_VALUE: 18
PIF_VALUE: 10
PIF_VALUE: 12
PIF_VALUE: 13
PIF_VALUE: 15
PIF_VALUE: 14
PIF_VALUE: 11
PIF_VALUE: 9
PIF_VALUE: 15
PIF_VALUE: 1
PIF_VALUE: 8
PIF_VALUE: 0
PIF_VALUE: 14
PIF_VALUE: 10
PIF_VALUE: 9
PIF_VALUE: 11
PIF_VALUE: 11
PIF_VALUE: 12
PIF_VALUE: 14
PIF_VALUE: 13
PIF_VALUE: 20
PIF_VALUE: 12
PIF_VALUE: 14
PIF_VALUE: 1
PIF_VALUE: 12
PIF_VALUE: 0

## 2020-12-04 ASSESSMENT — PAIN DESCRIPTION - ORIENTATION
ORIENTATION: LEFT

## 2020-12-04 ASSESSMENT — PAIN SCALES - GENERAL
PAINLEVEL_OUTOF10: 5
PAINLEVEL_OUTOF10: 0
PAINLEVEL_OUTOF10: 0
PAINLEVEL_OUTOF10: 3
PAINLEVEL_OUTOF10: 7
PAINLEVEL_OUTOF10: 3

## 2020-12-04 ASSESSMENT — PAIN DESCRIPTION - ONSET
ONSET: ON-GOING
ONSET: ON-GOING

## 2020-12-04 ASSESSMENT — PAIN DESCRIPTION - DESCRIPTORS
DESCRIPTORS: ACHING

## 2020-12-04 ASSESSMENT — PAIN DESCRIPTION - LOCATION
LOCATION: SHOULDER

## 2020-12-04 ASSESSMENT — PAIN DESCRIPTION - FREQUENCY
FREQUENCY: CONTINUOUS
FREQUENCY: CONTINUOUS

## 2020-12-04 ASSESSMENT — PAIN - FUNCTIONAL ASSESSMENT
PAIN_FUNCTIONAL_ASSESSMENT: PREVENTS OR INTERFERES SOME ACTIVE ACTIVITIES AND ADLS
PAIN_FUNCTIONAL_ASSESSMENT: 0-10
PAIN_FUNCTIONAL_ASSESSMENT: PREVENTS OR INTERFERES SOME ACTIVE ACTIVITIES AND ADLS

## 2020-12-04 ASSESSMENT — PAIN DESCRIPTION - PAIN TYPE
TYPE: CHRONIC PAIN

## 2020-12-04 ASSESSMENT — PAIN DESCRIPTION - PROGRESSION
CLINICAL_PROGRESSION: NOT CHANGED
CLINICAL_PROGRESSION: NOT CHANGED

## 2020-12-04 NOTE — OP NOTE
Operative Note      Patient: Rebecka Denver  YOB: 1965  MRN: [id number]    Date of Procedure: 12/4/2020    Pre-Op Diagnosis: RIGHT SHOULDER ROTATOR CUFF TEAR, impingement syndrome, labral tear, biceps tear    Post-Op Diagnosis: Same       Procedure(s):  RIGHT SHOULDER ARTHROSCOPY, ROTATOR CUFF REPAIR, SUBACROMIAL DECOMPRESSION (CPT 87940), labral debridement, biceps tenodesis    Surgeon(s): Esa Montoya DO    Assistant:   * No surgical staff found *    Anesthesia: General    Estimated Blood Loss (mL): Minimal    Complications: None    Specimens:   * No specimens in log *    Implants:  * No implants in log *      Drains: * No LDAs found *    Findings: as below    Detailed Description of Procedure:   below      PREOPERATIVE DIAGNOSES: (1) right shoulder rotator cuff tear. (2)   Subacromial impingement syndrome . (3) labral tear  POSTOPERATIVE DIAGNOSES: as above+ biceps tear  OPERATION: (1)right shoulder arthroscopy with arthroscopic rotator cuff   repair. (2) Subacromial arch decompression. (3) labral debridement(4) biceps tenodesis  ANESTHESIA: General   Surgeon: felix   Assistant:namrata/henrique  ESTIMATED BLOOD LOSS: Minimal  COMPLICATIONS: None. OPERATIVE IMPLANTS:2 swivel lock anchors    Brief Hospital Course: Rebecka Denver is a patient known to Daylin Noe DO's practice with persistent complaints of shoulder pain. shoulder pain has failed to be relieved by non-operative conservative measures, and has began affecting daily activities of living. After examination of the patient, review of the radiologic studies, and appropriate pre-operative risk assessment, Daylin Noe DO recommended shoulder arthroscopy, which the patient was agreeable towards. Operative Procedure:   I positioned the patient in the lateral decubitus position on a   beanbag, hung 10 pounds traction from the  arm, prepped and draped the   arm in sterile fashion.  I outlined an incision along the posterior, lateral, reapproximating the tendon to its  bed. The cuff was nicely approximated to its bed and had no abnormalities. I did thoroughly irrigate the wound upon closure, I checked the cuff for stability using a hook probe. The tendon repair was snug and well approximated to its footprint. I then removed all fluid from the shoulder joint and closed the incision with 4-0 Prolene in a horizontal mattress-type fashion. Sterile dressing was placed on the wound. Patient placed in a splint. The Patient tolerated the procedure well, woke up in the   recovery room without difficulty.      Electronically signed by Aminata Huynh DO on 12/4/2020 at 8:55 AM

## 2020-12-04 NOTE — H&P
Updated H&P      Chief Complaint   Patient presents with    Shoulder Pain       Bilateral shoulder WC followup         Chava Ambriz is a 47y.o. year old   male who is seen today  for evaluation of bilateral shoulder pain. He reports the pain has been ongoing since 8/12/2020 . He does recall a specific injury which started the pain. bwc injury, fell off a machine. He reports the pain is worse with activity, better with rest.  The patient does have mechanical symptoms. Hedoes have night pain. He denies a feeling of instability. The prior treatments have been ice, heat. The patient   has not responded to the treatment. The patient is right hand dominant. Per patient, [facility] surgery was approved.             Chief Complaint   Patient presents with    Shoulder Pain       Bilateral shoulder WC followup      Past Medical History   No past medical history on file. Past Surgical History   No past surgical history on file. Current Medication     Current Outpatient Medications:     amphetamine-dextroamphetamine (ADDERALL, 10MG,) 10 MG tablet, Take 10 mg by mouth daily. , Disp: , Rfl:     Aspirin-Acetaminophen-Caffeine (EXCEDRIN MIGRAINE PO), Take by mouth, Disp: , Rfl:     oxyCODONE (OXYCONTIN) 30 MG T12A extended release tablet, Take 15 mg by mouth every 12 hours.  , Disp: , Rfl:            Allergies   Allergen Reactions    Iv Dye [Iodides]         Iodine dye      Social History               Socioeconomic History    Marital status:        Spouse name: Not on file    Number of children: Not on file    Years of education: Not on file    Highest education level: Not on file   Occupational History    Not on file   Social Needs    Financial resource strain: Not on file    Food insecurity       Worry: Not on file       Inability: Not on file    Transportation needs       Medical: Not on file       Non-medical: Not on file   Tobacco Use    Smoking status: Former Smoker    Smokeless tobacco: Never Used   Substance and Sexual Activity    Alcohol use: Never    Drug use: Never    Sexual activity: Yes       Partners: Female   Lifestyle    Physical activity       Days per week: Not on file       Minutes per session: Not on file    Stress: Not on file   Relationships    Social connections       Talks on phone: Not on file       Gets together: Not on file       Attends [employer] service: Not on file       Active member of club or organization: Not on file       Attends meetings of clubs or organizations: Not on file       Relationship status: Not on file    Intimate partner violence       Fear of current or ex partner: Not on file       Emotionally abused: Not on file       Physically abused: Not on file       Forced sexual activity: Not on file   Other Topics Concern    Not on file   Social History Narrative    Not on file        Family History   No family history on file.        REVIEW OF SYSTEMS:      General/Constitution:  (-)weight loss, (-)fever, (-)chills, (-)weakness. Skin: (-) rash,(-) psoriasis,(-) eczema, (-)skin cancer. Musculoskeletal: (-) fractures,  (-) dislocations,(-) collagen vascular disease, (-) fibromyalgia, (-) multiple sclerosis, (-) muscular dystrophy, (-) RSD,(-) joint pain (-)swelling, (-) joint pain,swelling. Neurologic: (-) epilepsy, (-)seizures,(-) brain tumor,(-) TIA, (-)stroke, (-)headaches, (-)Parkinson disease,(-) memory loss, (-) LOC. Cardiovascular: (-) Chest pain, (-) swelling in legs/feet, (-) SOB, (-) cramping in legs/feet with walking. Respiratory: (-) SOB, (-) Coughing, (-) night sweats. GI: (-) nausea, (-) vomiting, (-) diarrhea, (-) blood in stool, (-) gastric ulcer. Psychiatric: (-) Depression, (-) Anxiety, (-) bipolar disease, (-) Alzheimer's Disease  Allergic/Immunologic: (-) allergies latex, (-) allergies metal, (-) skin sensitivity.   Hematlogic: (-) anemia, (-) blood transfusion, (-) DVT/PE, (-) Clotting disorders        Subjective: Vital signs are stable.  In general, patient is awake, alert and oriented X3, in no apparent distress.  Examination of HENT reveals normocephalic, atraumatic.  PERRLA/EOMI sclera are white.  Conjunctivae are clear.  TM's are intact.  Pharynx is pink and moist.  Uvula and tongue are midline.  Heart: Positive S1 and positive S2 with regular rate and rhythm.  Lungs: Clear to auscultation bilaterally without rales, rhonchi or wheezes.  Abdomen: soft, nontender.  Positive bowel sounds.  No organomegaly.  No guarding or rigidity.        Constitution:  Ht 5' 8\" (1.727 m)   Wt 160 lb (72.6 kg)   BMI 24.33 kg/m²        Psycihatric:  The patient is alert and oriented x 3, appears to be stated age and in no distress.       Respiratory:  Respiratory effort is not labored. Patient is not gasping. Palpation of the chest reveals no tactile fremitus.     Skin:  Upon inspection: the skin appears warm, dry and intact. There is  a previous scar over the affected area. There is not any cellulitis, lymphedema or cutaneous lesions noted in the lower extremities. Upon palpation there is no induration noted.       Neurologic:  Motor exam of the upper extremities show: The reflexes in biceps/triceps/brachioradialis are equal and symmetric. Sensory exam C5-T1 are normal bilaterally.         Cardiovascular: The vascular exam is normal and is well perfused to distal extremities. There are 2+ radial pulses bilaterally, and motor and sensation is intact to median, ulnar, and radial, musclocutaneus, and axillary nerve distribution and grossly symmetric bilaterally. There is cap refill noted less than two seconds in all digits. There is not edema of the bilateral upper extremities.   There is not varicosities noted in the distal extremities.       Lymph:  Upon palpation,  there is no lymphadenopathy noted in bilateral upper extremities.       Musculoskeletal:  Gait: normal; examination of the nails and digits reveal no cyanosis or clubbing.        Cervical Exam:  On physical exam, Roslyn Pacheco is well-developed, well-nourished, oriented to person, place and time. his gait is normal.  On evaluation of hiscervical spine, He has full range of motion of the cervical spine without pain. There is no cervical tenderness to palpation.      Shoulder Exam:  On evaluation of his bilaterally upper extremities, his bilateral shoulder has no deformity. There is tenderness upon palpation of the greater tuberosity and lateral shoulder. There is not evidence of scapular dyskinesis. There is not muscle atrophy in shoulder girdle. The range of motion for the Right Shoulder is 100/20/pl and for the Left shoulder is 100/20/pl.   Right shoulder Motor strength is 5-/5 in the supraspinatus, 5/5 internal rotation and 5-/5 in external rotation, and Left shoulder motor strength 5-/5 in supraspinatus, 5/5 in internal rotation, 5-/5 in external rotation.         Right shoulder:  positive Impingement , positive Barcenas ,negative  Speeds,negative  Apprehension ,negative Faustin Load Shift, negative Tessie manuver, negative Cross arm test.      Left shoulder:  positive Impingement , positive Barcenas ,negative  Speeds,negative  Apprehension ,negative Faustin Load Shift, negative Tessie manuver, negative Cross arm test.      MRI:  Right shoulder:      Full-thickness tear of distal supraspinatus.         Injury without tear of biceps labral anchor.         Degeneration of the glenoid labrum without labral tear.         Acromioclavicular arthropathy which predisposes to rotator cuff    injury.          Left shoulder:  Full-thickness tear of the distal supraspinatus.         Enthesopathic change of superolateral humeral head.         Acromioclavicular arthropathy which predisposes to rotator cuff tear.         Partial intrasubstance tear of the biceps labral anchor.         Degeneration without tear of the glenoid labrum.             Radiographic findings reviewed with

## 2020-12-17 ENCOUNTER — OFFICE VISIT (OUTPATIENT)
Dept: ORTHOPEDIC SURGERY | Age: 55
End: 2020-12-17

## 2020-12-17 VITALS — TEMPERATURE: 98 F | BODY MASS INDEX: 23.64 KG/M2 | WEIGHT: 156 LBS | HEIGHT: 68 IN

## 2020-12-17 PROCEDURE — 99024 POSTOP FOLLOW-UP VISIT: CPT | Performed by: ORTHOPAEDIC SURGERY

## 2020-12-17 RX ORDER — DOCUSATE SODIUM 100 MG/1
100 CAPSULE, LIQUID FILLED ORAL 2 TIMES DAILY
Qty: 60 CAPSULE | Refills: 0 | Status: SHIPPED | OUTPATIENT
Start: 2020-12-17 | End: 2021-01-16

## 2020-12-17 NOTE — PROGRESS NOTES
Mirta Landis is here for follow-up after right shoulder arthroscopy. Findings at surgery: rtc tear, impingement, labral tear, bicep tenodesis. Pain is controlled with current analgesics. Medication(s) being used: oxycodone . The patient denies fever, wound drainage, increasing redness, pus, increasing pain, increasing swelling. Post op problems reported: none. He is ambulating sling. Physical exam:  The wounds are clean, dry, no drainage. He has intact motor and sensory functions, grossly intact in the median, ulnar, radial, musculocutaneous, and axillary nerve distributions. He   has bounding pulses in the wrist.  Capillary refill is less than 2 seconds in all digits. Surgical pictures from the surgery were reveiwed with the patient     Impression:   Encounter Diagnoses   Name Primary?  Shoulder impingement, right Yes    Traumatic complete tear of right rotator cuff, initial encounter     Biceps tendon tear          Plan: Add bicep tendon tear to allowed conditions  I will remove the surgical sutures. The patient will now D/C the abduction pillow. He  will begin range of motion at the elbow, wrist and hand. He will continue to use analgesics to control the pain and will continue with sling immobilization. I will see them back in 4 weeks for repeat evaluation.

## 2021-01-15 ENCOUNTER — OFFICE VISIT (OUTPATIENT)
Dept: ORTHOPEDIC SURGERY | Age: 56
End: 2021-01-15

## 2021-01-15 DIAGNOSIS — M75.41 SHOULDER IMPINGEMENT, RIGHT: Primary | ICD-10-CM

## 2021-01-15 DIAGNOSIS — S46.011A TRAUMATIC COMPLETE TEAR OF RIGHT ROTATOR CUFF, INITIAL ENCOUNTER: ICD-10-CM

## 2021-01-15 DIAGNOSIS — S46.219A BICEPS TENDON TEAR: ICD-10-CM

## 2021-01-15 PROCEDURE — 99024 POSTOP FOLLOW-UP VISIT: CPT | Performed by: NURSE PRACTITIONER

## 2021-01-15 NOTE — PROGRESS NOTES
Aydin Page is here for post op visit after RTC repair and shoulder arthroscopy. The patient is post op week 6. The patient is  doing well. He reports no sling for the last 1.5 weeks. Physical exam:  The wounds are clean, dry, no drainage. Range of motion: diminished range of motion. He has intact motor and sensory functions, grossly intact in the median, ulnar, radial, musculocutaneous, and axillary nerve distributions. He has bounding pulses in the wrist.  Capillary refill is less than 2 seconds in all digits. Impression:  {  Encounter Diagnoses   Name Primary?  Shoulder impingement, right Yes    Traumatic complete tear of right rotator cuff, initial encounter     Biceps tendon tear        Plan:    The patient will now D/C the sling. He will continue range of motion at the elbow, wrist and hand and initiate physical therapy. He will continue to use analgesics to control the pain.      Remain off work 6 weeks   I will see them back in 6 weeks to [place] left and right

## 2021-02-23 ENCOUNTER — OFFICE VISIT (OUTPATIENT)
Dept: ORTHOPEDIC SURGERY | Age: 56
End: 2021-02-23

## 2021-02-23 VITALS — WEIGHT: 156 LBS | HEIGHT: 68 IN | BODY MASS INDEX: 23.64 KG/M2 | TEMPERATURE: 98 F

## 2021-02-23 DIAGNOSIS — S46.012A TRAUMATIC COMPLETE TEAR OF LEFT ROTATOR CUFF, INITIAL ENCOUNTER: Primary | ICD-10-CM

## 2021-02-23 PROCEDURE — 99024 POSTOP FOLLOW-UP VISIT: CPT | Performed by: ORTHOPAEDIC SURGERY

## 2021-02-23 NOTE — PROGRESS NOTES
Occupational History    Not on file   Social Needs    Financial resource strain: Not on file    Food insecurity     Worry: Not on file     Inability: Not on file    Transportation needs     Medical: Not on file     Non-medical: Not on file   Tobacco Use    Smoking status: Former Smoker     Years: 10.00     Types: Cigarettes    Smokeless tobacco: Never Used   Substance and Sexual Activity    Alcohol use: Never    Drug use: Never    Sexual activity: Yes     Partners: Female   Lifestyle    Physical activity     Days per week: Not on file     Minutes per session: Not on file    Stress: Not on file   Relationships    Social connections     Talks on phone: Not on file     Gets together: Not on file     Attends Yazdanism service: Not on file     Active member of club or organization: Not on file     Attends meetings of clubs or organizations: Not on file     Relationship status: Not on file    Intimate partner violence     Fear of current or ex partner: Not on file     Emotionally abused: Not on file     Physically abused: Not on file     Forced sexual activity: Not on file   Other Topics Concern    Not on file   Social History Narrative    Not on file     No family history on file. REVIEW OF SYSTEMS:     General/Constitution:  (-)weight loss, (-)fever, (-)chills, (-)weakness. Skin: (-) rash,(-) psoriasis,(-) eczema, (-)skin cancer. Musculoskeletal: (-) fractures,  (-) dislocations,(-) collagen vascular disease, (-) fibromyalgia, (-) multiple sclerosis, (-) muscular dystrophy, (-) RSD,(-) joint pain (-)swelling, (-) joint pain,swelling. Neurologic: (-) epilepsy, (-)seizures,(-) brain tumor,(-) TIA, (-)stroke, (-)headaches, (-)Parkinson disease,(-) memory loss, (-) LOC. Cardiovascular: (-) Chest pain, (-) swelling in legs/feet, (-) SOB, (-) cramping in legs/feet with walking. Respiratory: (-) SOB, (-) Coughing, (-) night sweats.   GI: (-) nausea, (-) vomiting, (-) diarrhea, (-) blood in stool, (-) gastric ulcer. Psychiatric: (-) Depression, (-) Anxiety, (-) bipolar disease, (-) Alzheimer's Disease  Allergic/Immunologic: (-) allergies latex, (-) allergies metal, (-) skin sensitivity. Hematlogic: (-) anemia, (-) blood transfusion, (-) DVT/PE, (-) Clotting disorders      Subjective:  _Temp 98 °F (36.7 °C)   Ht 5' 8\" (1.727 m)   Wt 156 lb (70.8 kg)   BMI 23.72 kg/m²  Vital signs are stable. In general, patient is awake, alert and oriented X3, in no apparent distress. Examination of HENT reveals normocephalic, atraumatic. PERRLA/EOMI sclera are white. Conjunctivae are clear. TM's are intact. Pharynx is pink and moist.  Uvula and tongue are midline. Heart: Positive S1 and positive S2 with regular rate and rhythm. Lungs: Clear to auscultation bilaterally without rales, rhonchi or wheezes. Abdomen: soft, nontender. Positive bowel sounds. No organomegaly. No guarding or rigidity. Constitution:  Temp 98 °F (36.7 °C)   Ht 5' 8\" (1.727 m)   Wt 156 lb (70.8 kg)   BMI 23.72 kg/m²     Psycihatric:  The patient is alert and oriented x 3, appears to be stated age and in no distress. Respiratory:  Respiratory effort is not labored. Patient is not gasping. Palpation of the chest reveals no tactile fremitus. Skin:  Upon inspection: the skin appears warm, dry and intact. There is  a previous scar over the affected area. There is not any cellulitis, lymphedema or cutaneous lesions noted in the lower extremities. Upon palpation there is no induration noted. Neurologic:  Motor exam of the upper extremities show: The reflexes in biceps/triceps/brachioradialis are equal and symmetric. Sensory exam C5-T1 are normal bilaterally. Cardiovascular: The vascular exam is normal and is well perfused to distal extremities. There are 2+ radial pulses bilaterally, and motor and sensation is intact to median, ulnar, and radial, musclocutaneus, and axillary nerve distribution and grossly symmetric bilaterally. There is cap refill noted less than two seconds in all digits. There is not edema of the bilateral upper extremities. There is not varicosities noted in the distal extremities. Lymph:  Upon palpation,  there is no lymphadenopathy noted in bilateral upper extremities. Musculoskeletal:  Gait: normal; examination of the nails and digits reveal no cyanosis or clubbing. Cervical Exam:  On physical exam, Ronak Marie Core is well-developed, well-nourished, oriented to person, place and time. his gait is normal.  On evaluation of hiscervical spine, He has full range of motion of the cervical spine without pain. There is no cervical tenderness to palpation. Shoulder Exam:  On evaluation of his bilaterally upper extremities, his bilateral shoulder has no deformity. There is tenderness upon palpation of the greater tuberosity and lateral shoulder. There is not evidence of scapular dyskinesis. There is not muscle atrophy in shoulder girdle. The range of motion for the Right Shoulder is 150/40/t8 and for the Left shoulder is 100/20/pl. Right shoulder Motor strength is 5-/5 in the supraspinatus, 5/5 internal rotation and 5-/5 in external rotation, and Left shoulder motor strength 5-/5 in supraspinatus, 5/5 in internal rotation, 5-/5 in external rotation.         Right shoulder:  positive Impingement , positive Barcenas ,negative  Speeds,negative  Apprehension ,negative Faustin Load Shift, negative Tessie manuver, negative Cross arm test.     Left shoulder:  positive Impingement , positive Barcenas ,negative  Speeds,negative  Apprehension ,negative Faustin Load Shift, negative Tessie manuver, negative Cross arm test.     MRI:      Left shoulder:  Full-thickness tear of the distal supraspinatus.         Enthesopathic change of superolateral humeral head.         Acromioclavicular arthropathy which predisposes to rotator cuff tear.         Partial intrasubstance tear of the biceps labral anchor.

## 2021-03-15 ENCOUNTER — HOSPITAL ENCOUNTER (OUTPATIENT)
Age: 56
Discharge: HOME | End: 2021-03-17
Payer: COMMERCIAL

## 2021-03-15 ENCOUNTER — HOSPITAL ENCOUNTER (OUTPATIENT)
Age: 56
Discharge: HOME | End: 2021-03-15
Payer: COMMERCIAL

## 2021-03-15 DIAGNOSIS — M75.102 TEAR OF LEFT ROTATOR CUFF, UNSPECIFIED TEAR EXTENT, UNSPECIFIED WHETHER TRAUMATIC: ICD-10-CM

## 2021-03-15 PROCEDURE — U0003 INFECTIOUS AGENT DETECTION BY NUCLEIC ACID (DNA OR RNA); SEVERE ACUTE RESPIRATORY SYNDROME CORONAVIRUS 2 (SARS-COV-2) (CORONAVIRUS DISEASE [COVID-19]), AMPLIFIED PROBE TECHNIQUE, MAKING USE OF HIGH THROUGHPUT TECHNOLOGIES AS DESCRIBED BY CMS-2020-01-R: HCPCS

## 2021-03-16 DIAGNOSIS — S46.012A TRAUMATIC COMPLETE TEAR OF LEFT ROTATOR CUFF, INITIAL ENCOUNTER: Primary | ICD-10-CM

## 2021-03-16 DIAGNOSIS — M75.41 SHOULDER IMPINGEMENT, RIGHT: ICD-10-CM

## 2021-03-16 LAB
SARS-COV-2: NOT DETECTED
SOURCE: NORMAL

## 2021-03-17 ENCOUNTER — HOSPITAL ENCOUNTER (OUTPATIENT)
Age: 56
Discharge: HOME | End: 2021-03-17
Payer: COMMERCIAL

## 2021-03-17 LAB
EKG ATRIAL RATE: 76 BPM
EKG P AXIS: 77 DEGREES
EKG P-R INTERVAL: 164 MS
EKG Q-T INTERVAL: 398 MS
EKG QRS DURATION: 100 MS
EKG QTC CALCULATION (BAZETT): 447 MS
EKG R AXIS: -45 DEGREES
EKG T AXIS: 52 DEGREES
EKG VENTRICULAR RATE: 76 BPM

## 2021-03-17 PROCEDURE — 93005 ELECTROCARDIOGRAM TRACING: CPT | Performed by: ANESTHESIOLOGY

## 2021-03-18 ENCOUNTER — ANESTHESIA EVENT (OUTPATIENT)
Dept: OPERATING ROOM | Age: 56
End: 2021-03-18
Payer: COMMERCIAL

## 2021-03-18 NOTE — ANESTHESIA PRE PROCEDURE
Department of Anesthesiology  Preprocedure Note       Name:  Tyrell Medina   Age:  54 y.o.  :  1965                                          MRN:  [id number]         Date:  3/18/2021      Surgeon: Rahul Reid): Nathan Nesbitt DO    Procedure: Procedure(s):  LEFT SHOULDER ARTHROSCOPY, SUBACROMIAL DECOMPRESSION,  ROTATOR CUFF REPAIR AND DEBRIDEMENT (CPT 65054) ([address])    Medications prior to admission:   Prior to Admission medications    Medication Sig Start Date End Date Taking? Authorizing Provider   amphetamine-dextroamphetamine (ADDERALL, 10MG,) 10 MG tablet Take 10 mg by mouth daily. Yes Historical Provider, MD   oxyCODONE (OXYCONTIN) 30 MG T12A extended release tablet Take 30 mg by mouth every 8 hours as needed. Yes Historical Provider, MD       Current medications:    No current facility-administered medications for this encounter. Current Outpatient Medications   Medication Sig Dispense Refill    amphetamine-dextroamphetamine (ADDERALL, 10MG,) 10 MG tablet Take 10 mg by mouth daily.  oxyCODONE (OXYCONTIN) 30 MG T12A extended release tablet Take 30 mg by mouth every 8 hours as needed. Allergies:     Allergies   Allergen Reactions    Iv Dye [Iodides] Anaphylaxis     Iodine dye       Problem List:    Patient Active Problem List   Diagnosis Code    Closed displaced avulsion fracture of left ilium ([facility] [address] S32.312A    Acute internal derangement of left knee M23.92    Left shoulder strain S46.912A    Right shoulder strain S46.911A    Closed fracture of one rib of left side S22.32XA    Complete rotator cuff tear or rupture of right shoulder, not specified as traumatic M75.121    Impingement syndrome of right shoulder M75.41       Past Medical History:        Diagnosis Date    ADHD     Kidney stones     Work related injury 2020    BILAT torn roatator cuffs ,chest contusion        Past Surgical History:        Procedure Laterality Date    COLONOSCOPY      FRACTURE SURGERY FOOT   age 15  caught his foot in [address] Bilateral     arthroscopy    SHOULDER ARTHROSCOPY Right 12/04/2020    rotator cuff repair, subacromial decompression and debridement    SHOULDER ARTHROSCOPY Right 12/04/2020    RIGHT SHOULDER ARTHROSCOPY, ROTATOR CUFF REPAIR, SUBACROMIAL DECOMPRESSION (CPT 32524) performed by Casilda Schwab, DO at [address] History:    Social History     Tobacco Use    Smoking status: Former Smoker     Years: 10.00     Types: Cigarettes    Smokeless tobacco: Never Used   Substance Use Topics    Alcohol use: Never                                Counseling given: Not Answered      Vital Signs (Current):   Vitals:    03/12/21 1007   Weight: 160 lb (72.6 kg)   Height: 5' 8\" (1.727 m)                                              BP Readings from Last 3 Encounters:   12/04/20 (!) 140/92   12/04/20 136/84   10/08/20 (!) 141/90       NPO Status:                                                                                 BMI:   Wt Readings from Last 3 Encounters:   02/23/21 156 lb (70.8 kg)   12/17/20 156 lb (70.8 kg)   12/04/20 156 lb (70.8 kg)     Body mass index is 24.33 kg/m². CBC:   Lab Results   Component Value Date    WBC 7.1 08/13/2020    RBC 4.56 08/13/2020    HGB 14.2 08/13/2020    HCT 39.9 08/13/2020    MCV 87.5 08/13/2020    RDW 12.5 08/13/2020     08/13/2020       CMP:   Lab Results   Component Value Date     08/13/2020    K 4.2 08/13/2020     08/13/2020    CO2 27 08/13/2020    BUN 19 08/13/2020    CREATININE 0.8 08/13/2020    GFRAA >60 08/13/2020    LABGLOM >60 08/13/2020    GLUCOSE 112 08/13/2020    PROT 6.6 08/13/2020    CALCIUM 9.3 08/13/2020    BILITOT 1.7 08/13/2020    ALKPHOS 47 08/13/2020    AST 47 08/13/2020    ALT 44 08/13/2020       POC Tests: No results for input(s): POCGLU, POCNA, POCK, POCCL, POCBUN, POCHEMO, POCHCT in the last 72 hours.     Coags:   Lab Results   Component Value Date    PROTIME 12.0 08/13/2020    INR 1.1 08/13/2020    APTT 30.8 08/13/2020       HCG (If Applicable): No results found for: PREGTESTUR, PREGSERUM, HCG, HCGQUANT     ABGs: No results found for: PHART, PO2ART, EFU8DTT, MIK2YHE, BEART, I1BQBMCH     Type & Screen (If Applicable):  No results found for: LABABO, LABRH    Drug/Infectious Status (If Applicable):  No results found for: HIV, HEPCAB    COVID-19 Screening (If Applicable):   Lab Results   Component Value Date    COVID19 Not Detected 03/15/2021           Anesthesia Evaluation  Patient summary reviewed and Nursing notes reviewed  Airway: Mallampati: II  TM distance: >3 FB   Neck ROM: full  Mouth opening: > = 3 FB Dental: normal exam         Pulmonary:Negative Pulmonary ROS breath sounds clear to auscultation                            ROS comment: History of smoking   Cardiovascular:Negative CV ROS            Rhythm: regular                   ROS comment: EKG: NSR, left anterior fascicular block, abnormal EKG     Neuro/Psych:   (+) psychiatric history ( H/O ADHD):            GI/Hepatic/Renal:   (+) renal disease: kidney stones,           Endo/Other: Negative Endo/Other ROS                    Abdominal:           Vascular: negative vascular ROS. Anesthesia Plan      general and regional     ASA 2     (Left interscalene single shot nerve block for post-operative pain management.)      MIPS: Postoperative opioids intended. Anesthetic plan and risks discussed with patient. Plan discussed with CRNA. Patient not seen:  history, data, and pertinent studies from chart review. Patient to be seen and examined by the DOS anesthesiologist.    Cricket Samson MD  Staff Anesthesiologist  March 18, 2021  2:44 PM        Crickte Samson MD   3/18/2021    DOS STAFF ADDENDUM:    Pt seen and examined, chart reviewed (including anesthesia, drug and allergy history).        Anesthetic plan, risks, benefits, alternatives, and personnel involved discussed with patient. Patient verbalized an understanding and agrees to proceed. Plan discussed with care team members and agreed upon.     Jailene Friend MD  Staff Anesthesiologist  11:22 AM

## 2021-03-19 ENCOUNTER — ANESTHESIA (OUTPATIENT)
Dept: OPERATING ROOM | Age: 56
End: 2021-03-19
Payer: COMMERCIAL

## 2021-03-19 ENCOUNTER — HOSPITAL ENCOUNTER (OUTPATIENT)
Age: 56
Setting detail: OUTPATIENT SURGERY
Discharge: HOME | End: 2021-03-19
Attending: ORTHOPAEDIC SURGERY | Admitting: ORTHOPAEDIC SURGERY
Payer: COMMERCIAL

## 2021-03-19 VITALS
WEIGHT: 162 LBS | OXYGEN SATURATION: 96 % | TEMPERATURE: 98.1 F | BODY MASS INDEX: 24.55 KG/M2 | DIASTOLIC BLOOD PRESSURE: 91 MMHG | HEART RATE: 74 BPM | HEIGHT: 68 IN | SYSTOLIC BLOOD PRESSURE: 142 MMHG | RESPIRATION RATE: 14 BRPM

## 2021-03-19 VITALS
TEMPERATURE: 95.7 F | DIASTOLIC BLOOD PRESSURE: 81 MMHG | RESPIRATION RATE: 60 BRPM | SYSTOLIC BLOOD PRESSURE: 107 MMHG | OXYGEN SATURATION: 95 %

## 2021-03-19 DIAGNOSIS — M75.102 TEAR OF LEFT ROTATOR CUFF, UNSPECIFIED TEAR EXTENT, UNSPECIFIED WHETHER TRAUMATIC: Primary | ICD-10-CM

## 2021-03-19 PROBLEM — M75.42 IMPINGEMENT SYNDROME OF LEFT SHOULDER: Status: ACTIVE | Noted: 2021-03-19

## 2021-03-19 PROCEDURE — 3700000000 HC ANESTHESIA ATTENDED CARE: Performed by: ORTHOPAEDIC SURGERY

## 2021-03-19 PROCEDURE — 3600000014 HC SURGERY LEVEL 4 ADDTL 15MIN: Performed by: ORTHOPAEDIC SURGERY

## 2021-03-19 PROCEDURE — 6370000000 HC RX 637 (ALT 250 FOR IP): Performed by: ANESTHESIOLOGY

## 2021-03-19 PROCEDURE — 7100000011 HC PHASE II RECOVERY - ADDTL 15 MIN: Performed by: ORTHOPAEDIC SURGERY

## 2021-03-19 PROCEDURE — 7100000001 HC PACU RECOVERY - ADDTL 15 MIN: Performed by: ORTHOPAEDIC SURGERY

## 2021-03-19 PROCEDURE — 7100000010 HC PHASE II RECOVERY - FIRST 15 MIN: Performed by: ORTHOPAEDIC SURGERY

## 2021-03-19 PROCEDURE — 6360000002 HC RX W HCPCS

## 2021-03-19 PROCEDURE — 64415 NJX AA&/STRD BRCH PLXS IMG: CPT | Performed by: ANESTHESIOLOGY

## 2021-03-19 PROCEDURE — 3700000001 HC ADD 15 MINUTES (ANESTHESIA): Performed by: ORTHOPAEDIC SURGERY

## 2021-03-19 PROCEDURE — 7100000000 HC PACU RECOVERY - FIRST 15 MIN: Performed by: ORTHOPAEDIC SURGERY

## 2021-03-19 PROCEDURE — 2580000003 HC RX 258: Performed by: ANESTHESIOLOGY

## 2021-03-19 PROCEDURE — 2580000003 HC RX 258: Performed by: ORTHOPAEDIC SURGERY

## 2021-03-19 PROCEDURE — 6360000002 HC RX W HCPCS: Performed by: ORTHOPAEDIC SURGERY

## 2021-03-19 PROCEDURE — 29823 SHO ARTHRS SRG XTNSV DBRDMT: CPT | Performed by: ORTHOPAEDIC SURGERY

## 2021-03-19 PROCEDURE — 2500000003 HC RX 250 WO HCPCS

## 2021-03-19 PROCEDURE — 2709999900 HC NON-CHARGEABLE SUPPLY: Performed by: ORTHOPAEDIC SURGERY

## 2021-03-19 PROCEDURE — 29826 SHO ARTHRS SRG DECOMPRESSION: CPT | Performed by: ORTHOPAEDIC SURGERY

## 2021-03-19 PROCEDURE — 6360000002 HC RX W HCPCS: Performed by: NURSE PRACTITIONER

## 2021-03-19 PROCEDURE — 3600000004 HC SURGERY LEVEL 4 BASE: Performed by: ORTHOPAEDIC SURGERY

## 2021-03-19 PROCEDURE — 2720000010 HC SURG SUPPLY STERILE: Performed by: ORTHOPAEDIC SURGERY

## 2021-03-19 PROCEDURE — 6360000002 HC RX W HCPCS: Performed by: ANESTHESIOLOGY

## 2021-03-19 RX ORDER — MIDAZOLAM HYDROCHLORIDE 1 MG/ML
INJECTION INTRAMUSCULAR; INTRAVENOUS PRN
Status: DISCONTINUED | OUTPATIENT
Start: 2021-03-19 | End: 2021-03-19 | Stop reason: SDUPTHER

## 2021-03-19 RX ORDER — SODIUM CHLORIDE, SODIUM LACTATE, POTASSIUM CHLORIDE, CALCIUM CHLORIDE 600; 310; 30; 20 MG/100ML; MG/100ML; MG/100ML; MG/100ML
INJECTION, SOLUTION INTRAVENOUS CONTINUOUS
Status: DISCONTINUED | OUTPATIENT
Start: 2021-03-19 | End: 2021-03-19 | Stop reason: HOSPADM

## 2021-03-19 RX ORDER — CEFAZOLIN SODIUM 2 G/50ML
2000 SOLUTION INTRAVENOUS ONCE
Status: COMPLETED | OUTPATIENT
Start: 2021-03-19 | End: 2021-03-19

## 2021-03-19 RX ORDER — FENTANYL CITRATE 50 UG/ML
INJECTION, SOLUTION INTRAMUSCULAR; INTRAVENOUS PRN
Status: DISCONTINUED | OUTPATIENT
Start: 2021-03-19 | End: 2021-03-19 | Stop reason: SDUPTHER

## 2021-03-19 RX ORDER — ONDANSETRON 2 MG/ML
4 INJECTION INTRAMUSCULAR; INTRAVENOUS
Status: DISCONTINUED | OUTPATIENT
Start: 2021-03-19 | End: 2021-03-19 | Stop reason: HOSPADM

## 2021-03-19 RX ORDER — HYDROCODONE BITARTRATE AND ACETAMINOPHEN 5; 325 MG/1; MG/1
2 TABLET ORAL PRN
Status: COMPLETED | OUTPATIENT
Start: 2021-03-19 | End: 2021-03-19

## 2021-03-19 RX ORDER — ONDANSETRON 2 MG/ML
INJECTION INTRAMUSCULAR; INTRAVENOUS PRN
Status: DISCONTINUED | OUTPATIENT
Start: 2021-03-19 | End: 2021-03-19 | Stop reason: SDUPTHER

## 2021-03-19 RX ORDER — HYDROCODONE BITARTRATE AND ACETAMINOPHEN 5; 325 MG/1; MG/1
1 TABLET ORAL PRN
Status: COMPLETED | OUTPATIENT
Start: 2021-03-19 | End: 2021-03-19

## 2021-03-19 RX ORDER — PROPOFOL 10 MG/ML
INJECTION, EMULSION INTRAVENOUS PRN
Status: DISCONTINUED | OUTPATIENT
Start: 2021-03-19 | End: 2021-03-19 | Stop reason: SDUPTHER

## 2021-03-19 RX ORDER — LIDOCAINE HYDROCHLORIDE 20 MG/ML
INJECTION, SOLUTION EPIDURAL; INFILTRATION; INTRACAUDAL; PERINEURAL PRN
Status: DISCONTINUED | OUTPATIENT
Start: 2021-03-19 | End: 2021-03-19 | Stop reason: SDUPTHER

## 2021-03-19 RX ORDER — DEXAMETHASONE SODIUM PHOSPHATE 4 MG/ML
INJECTION, SOLUTION INTRA-ARTICULAR; INTRALESIONAL; INTRAMUSCULAR; INTRAVENOUS; SOFT TISSUE PRN
Status: DISCONTINUED | OUTPATIENT
Start: 2021-03-19 | End: 2021-03-19 | Stop reason: SDUPTHER

## 2021-03-19 RX ORDER — KETOROLAC TROMETHAMINE 30 MG/ML
INJECTION, SOLUTION INTRAMUSCULAR; INTRAVENOUS PRN
Status: DISCONTINUED | OUTPATIENT
Start: 2021-03-19 | End: 2021-03-19 | Stop reason: SDUPTHER

## 2021-03-19 RX ORDER — FENTANYL CITRATE 50 UG/ML
25 INJECTION, SOLUTION INTRAMUSCULAR; INTRAVENOUS EVERY 5 MIN PRN
Status: DISCONTINUED | OUTPATIENT
Start: 2021-03-19 | End: 2021-03-19 | Stop reason: HOSPADM

## 2021-03-19 RX ORDER — GLYCOPYRROLATE 1 MG/5 ML
SYRINGE (ML) INTRAVENOUS PRN
Status: DISCONTINUED | OUTPATIENT
Start: 2021-03-19 | End: 2021-03-19 | Stop reason: SDUPTHER

## 2021-03-19 RX ORDER — ROPIVACAINE HYDROCHLORIDE 5 MG/ML
INJECTION, SOLUTION EPIDURAL; INFILTRATION; PERINEURAL
Status: COMPLETED | OUTPATIENT
Start: 2021-03-19 | End: 2021-03-19

## 2021-03-19 RX ORDER — NEOSTIGMINE METHYLSULFATE 1 MG/ML
INJECTION, SOLUTION INTRAVENOUS PRN
Status: DISCONTINUED | OUTPATIENT
Start: 2021-03-19 | End: 2021-03-19 | Stop reason: SDUPTHER

## 2021-03-19 RX ADMIN — PROPOFOL 160 MG: 10 INJECTION, EMULSION INTRAVENOUS at 12:12

## 2021-03-19 RX ADMIN — CEFAZOLIN SODIUM 2000 MG: 2 SOLUTION INTRAVENOUS at 11:54

## 2021-03-19 RX ADMIN — HYDROCODONE BITARTRATE AND ACETAMINOPHEN 1 TABLET: 5; 325 TABLET ORAL at 14:17

## 2021-03-19 RX ADMIN — FENTANYL CITRATE 50 MCG: 50 INJECTION, SOLUTION INTRAMUSCULAR; INTRAVENOUS at 12:12

## 2021-03-19 RX ADMIN — ROPIVACAINE HYDROCHLORIDE 20 ML: 5 INJECTION, SOLUTION EPIDURAL; INFILTRATION; PERINEURAL at 12:30

## 2021-03-19 RX ADMIN — Medication 0.6 MG: at 13:21

## 2021-03-19 RX ADMIN — MIDAZOLAM 2 MG: 1 INJECTION INTRAMUSCULAR; INTRAVENOUS at 11:52

## 2021-03-19 RX ADMIN — FENTANYL CITRATE 50 MCG: 50 INJECTION, SOLUTION INTRAMUSCULAR; INTRAVENOUS at 11:56

## 2021-03-19 RX ADMIN — Medication 3 MG: at 13:21

## 2021-03-19 RX ADMIN — LIDOCAINE HYDROCHLORIDE 60 MG: 20 INJECTION, SOLUTION EPIDURAL; INFILTRATION; INTRACAUDAL; PERINEURAL at 12:12

## 2021-03-19 RX ADMIN — KETOROLAC TROMETHAMINE 30 MG: 30 INJECTION, SOLUTION INTRAMUSCULAR; INTRAVENOUS at 13:13

## 2021-03-19 RX ADMIN — ONDANSETRON 4 MG: 2 INJECTION INTRAMUSCULAR; INTRAVENOUS at 13:20

## 2021-03-19 RX ADMIN — SODIUM CHLORIDE, POTASSIUM CHLORIDE, SODIUM LACTATE AND CALCIUM CHLORIDE: 600; 310; 30; 20 INJECTION, SOLUTION INTRAVENOUS at 10:50

## 2021-03-19 RX ADMIN — DEXAMETHASONE SODIUM PHOSPHATE 10 MG: 4 INJECTION, SOLUTION INTRAMUSCULAR; INTRAVENOUS at 12:40

## 2021-03-19 ASSESSMENT — PULMONARY FUNCTION TESTS
PIF_VALUE: 15
PIF_VALUE: 14
PIF_VALUE: 14
PIF_VALUE: 20
PIF_VALUE: 0
PIF_VALUE: 16
PIF_VALUE: 14
PIF_VALUE: 27
PIF_VALUE: 12
PIF_VALUE: 14
PIF_VALUE: 16
PIF_VALUE: 14
PIF_VALUE: 18
PIF_VALUE: 14
PIF_VALUE: 18
PIF_VALUE: 13
PIF_VALUE: 13
PIF_VALUE: 3
PIF_VALUE: 14
PIF_VALUE: 17
PIF_VALUE: 14

## 2021-03-19 ASSESSMENT — PAIN SCALES - GENERAL
PAINLEVEL_OUTOF10: 8
PAINLEVEL_OUTOF10: 0
PAINLEVEL_OUTOF10: 0

## 2021-03-19 ASSESSMENT — PAIN - FUNCTIONAL ASSESSMENT: PAIN_FUNCTIONAL_ASSESSMENT: 0-10

## 2021-03-19 NOTE — OP NOTE
Operative Note      Patient: Eder Calderon  YOB: 1965  MRN: [id number]    Date of Procedure: 3/19/2021    Pre-Op Diagnosis: LEFT SHOULDER ROTATOR CUFF TEAR    Post-Op Diagnosis: Same       Procedure(s):  LEFT SHOULDER ARTHROSCOPY, SUBACROMIAL DECOMPRESSION,  ROTATOR CUFF REPAIR AND DEBRIDEMENT (CPT 75636) ([address])    Surgeon(s): Raeford Lennox, DO    Assistant:   Resident: Mckenzie Lindsay DO; Isabel Vance DO; Stepan Perez DO    Anesthesia: General    Estimated Blood Loss (mL): Minimal    Complications: None    Specimens:   * No specimens in log *    Implants:  * No implants in log *      Drains: * No LDAs found *    Findings: as above    Detailed Description of Procedure:   below        SURGEON: Sae Torres D.O.   ASSISTANT: None. PREOPERATIVE DIAGNOSIS: (1) Subacromial impingement, left shoulder (2) rotator cuff tear  POSTOPERATIVE DIAGNOSES: (1) Subacromial impingement, leftshoulder. (2)   Partial thickness rotator cuff tear. (3) Anterior and posterior labral tear. OPERATION: left shoulder arthroscopy with subacromial arch decompression.   (2) Labral debridement. (3) Debridement of partial thickness rotator cuff   Tear/bursa  ANESTHESIA: General.   ESTIMATED BLOOD LOSS: Minimal.   COMPLICATIONS: None. OPERATIVE PROCEDURE: The patient was taken to the operative suite and was   given a general anesthesia. The patient was positioned in the lateral   decubitus position with a beanbag and then prepped and draped the arm in a   sterile fashion. I outlined an incision along the lateral left shoulder. Hung 10 pounds of   traction from theleft arm, outlined the incisions along the acromial border,   1 posterolateral and lateral, and 1 anterior. I placed a blunt trocar within   the glenohumeral joint and carried out carried out a diagnostic arthroscopy. The patient had evidence of no significant articular cartilage damage in   either the glenoid or humeral head.  There was evidence of a tear involving the anterior and posterior labrum, no abnormalities of the biceps   tendon. Biceps was in normal condition. Rotator cuff from the undersurface showed undersurface fraying, I established   the anterior working portal at the rotator cuff interval and debrided the   labral tear, and the pt rtc tear using the 4-0 shaver. I then removed all fluid from the shoulder joint and went into   the subacromial space and completed the complete subacromial bursectomy. Then using an ArthroCare electrode,   I outlined the acromial edges using ArthroCare electrode. I then  smoothed the   anterior and inferior acromion using a 5-0 alexei, performed an acromioplastysmoothing to a stable   Type 1 contour. I   flattened the acromial arch. No other abnormalities noted. I then removed all fluid from the shoulder joint and closed the incision with   4-0 Prolene in a horizontal mattress-type fashion. Sterile dressing was   placed on the wound. The patient recovered in the recovery room without   difficulty.              Electronically signed by Vidya Cordero DO on 3/19/2021 at 1:29 PM

## 2021-03-19 NOTE — H&P
Updated H&p    Chief Complaint   Patient presents with    Shoulder Pain       Right Shoulder RTC DOS 12/04/2020, [facility], wants to discuss left shoulder surger      Past Medical History        Past Medical History:   Diagnosis Date    ADHD      Kidney stones      Work related injury 08/2020     BILAT torn roatator cuffs ,chest contusion          Past Surgical History         Past Surgical History:   Procedure Laterality Date    COLONOSCOPY        FRACTURE SURGERY         FOOT    KNEE SURGERY Bilateral      SHOULDER ARTHROSCOPY Right 12/04/2020     rotator cuff repair, subacromial decompression and debridement    SHOULDER ARTHROSCOPY Right 12/4/2020     RIGHT SHOULDER ARTHROSCOPY, ROTATOR CUFF REPAIR, SUBACROMIAL DECOMPRESSION (CPT 29194) performed by Shelby Parra DO at [address] [place] [address]           Current Medication      Current Outpatient Medications:     amphetamine-dextroamphetamine (ADDERALL, 10MG,) 10 MG tablet, Take 10 mg by mouth daily. , Disp: , Rfl:     oxyCODONE (OXYCONTIN) 30 MG T12A extended release tablet, Take 30 mg by mouth every 8 hours as needed.  , Disp: , Rfl:            Allergies   Allergen Reactions    Iv Dye [Iodides] Anaphylaxis       Iodine dye      Social History               Socioeconomic History    Marital status:        Spouse name: Not on file    Number of children: Not on file    Years of education: Not on file    Highest education level: Not on file   Occupational History    Not on file   Social Needs    Financial resource strain: Not on file    Food insecurity       Worry: Not on file       Inability: Not on file    Transportation needs       Medical: Not on file       Non-medical: Not on file   Tobacco Use    Smoking status: Former Smoker       Years: 10.00       Types: Cigarettes    Smokeless tobacco: Never Used   Substance and Sexual Activity    Alcohol use: Never    Drug use: Never    Sexual activity: Yes       Partners: Female   Lifestyle    Physical activity       Days per week: Not on file       Minutes per session: Not on file    Stress: Not on file   Relationships    Social connections       Talks on phone: Not on file       Gets together: Not on file       Attends [employer] service: Not on file       Active member of club or organization: Not on file       Attends meetings of clubs or organizations: Not on file       Relationship status: Not on file    Intimate partner violence       Fear of current or ex partner: Not on file       Emotionally abused: Not on file       Physically abused: Not on file       Forced sexual activity: Not on file   Other Topics Concern    Not on file   Social History Narrative    Not on file         Family History   No family history on file.        REVIEW OF SYSTEMS:      General/Constitution:  (-)weight loss, (-)fever, (-)chills, (-)weakness. Skin: (-) rash,(-) psoriasis,(-) eczema, (-)skin cancer. Musculoskeletal: (-) fractures,  (-) dislocations,(-) collagen vascular disease, (-) fibromyalgia, (-) multiple sclerosis, (-) muscular dystrophy, (-) RSD,(-) joint pain (-)swelling, (-) joint pain,swelling. Neurologic: (-) epilepsy, (-)seizures,(-) brain tumor,(-) TIA, (-)stroke, (-)headaches, (-)Parkinson disease,(-) memory loss, (-) LOC. Cardiovascular: (-) Chest pain, (-) swelling in legs/feet, (-) SOB, (-) cramping in legs/feet with walking. Respiratory: (-) SOB, (-) Coughing, (-) night sweats. GI: (-) nausea, (-) vomiting, (-) diarrhea, (-) blood in stool, (-) gastric ulcer. Psychiatric: (-) Depression, (-) Anxiety, (-) bipolar disease, (-) Alzheimer's Disease  Allergic/Immunologic: (-) allergies latex, (-) allergies metal, (-) skin sensitivity. Hematlogic: (-) anemia, (-) blood transfusion, (-) DVT/PE, (-) Clotting disorders        Subjective:      Vital signs are stable.  In general, patient is awake, alert and oriented X3, in no apparent distress.  Examination of HENT reveals normocephalic, atraumatic. well-nourished, oriented to person, place and time. his gait is normal.  On evaluation of hiscervical spine, He has full range of motion of the cervical spine without pain. There is no cervical tenderness to palpation.      Shoulder Exam:  On evaluation of his bilaterally upper extremities, his bilateral shoulder has no deformity. There is tenderness upon palpation of the greater tuberosity and lateral shoulder. There is not evidence of scapular dyskinesis. There is not muscle atrophy in shoulder girdle. The range of motion for the Right Shoulder is 150/40/t8 and for the Left shoulder is 100/20/pl. Right shoulder Motor strength is 5-/5 in the supraspinatus, 5/5 internal rotation and 5-/5 in external rotation, and Left shoulder motor strength 5-/5 in supraspinatus, 5/5 in internal rotation, 5-/5 in external rotation.         Right shoulder:  positive Impingement , positive Barcenas ,negative  Speeds,negative  Apprehension ,negative Faustin Load Shift, negative Tessie manuver, negative Cross arm test.      Left shoulder:  positive Impingement , positive Barcenas ,negative  Speeds,negative  Apprehension ,negative Faustin Load Shift, negative Tessie manuver, negative Cross arm test.      MRI:       Left shoulder:  Full-thickness tear of the distal supraspinatus.         Enthesopathic change of superolateral humeral head.         Acromioclavicular arthropathy which predisposes to rotator cuff tear.         Partial intrasubstance tear of the biceps labral anchor.         Degeneration without tear of the glenoid labrum.             Radiographic findings reviewed with patient     Impression:        Encounter Diagnosis   Name Primary?  Traumatic complete tear of left rotator cuff, initial encounter Yes         Plan: Natural history and expected course discussed. Questions answered. Educational material distributed. Reduction in offending activity. Gentle ROM exercises  RICE therapy.   Patient will undergo left shoulder arthroscopy with rotator cuff repair, debridement and subacromial decompression 3/19//2021  The risks and benefits were reviewed with the patient such as: arthorfibrosis shoulder, DVT, infection,  injuries to blood vessels and nerves, non relief of symptoms, recurrent tear, continued pain, worsening of symptoms. At least 25 minutes was spent discussing the diagnosis and treatment options with the patient with at least 50% of the time was spent with decision making and counseling the patient. The patient was counseled at length about the risks of mariano Covid-19 during their perioperative period and any recovery window from their procedure.  The patient was made aware that mariano Covid-19  may worsen their prognosis for recovering from their procedure  and lend to a higher morbidity and/or mortality risk.  All material risks, benefits, and reasonable alternatives including postponing the procedure were discussed. The patient does wish to proceed with the procedure at this time.

## 2021-03-19 NOTE — ANESTHESIA POSTPROCEDURE EVALUATION
Department of Anesthesiology  Postprocedure Note    Patient: Massiel Burton  MRN: [id number]  YOB: 1965  Date of evaluation: 3/19/2021  Time:  2:16 PM     Procedure Summary     Date: 03/19/21 Room / Location: [address] [place] [address] 02 / [facility]    Anesthesia Start: 1152 Anesthesia Stop: 9903    Procedure: LEFT SHOULDER ARTHROSCOPY, SUBACROMIAL DECOMPRESSION,  ROTATOR CUFF REPAIR AND DEBRIDEMENT (CPT 48930) ([address]) (Left ) Diagnosis: (LEFT SHOULDER ROTATOR CUFF TEAR)    Surgeons: Raudel Cyr DO Responsible Provider: Tito Vinson MD    Anesthesia Type: general, regional ASA Status: 2          Anesthesia Type: general, regional    Mildred Phase I: Mildred Score: 10    Mildred Phase II: Mildred Score: 10    Last vitals: Reviewed and per EMR flowsheets.        Anesthesia Post Evaluation    Patient location during evaluation: PACU  Patient participation: complete - patient participated  Level of consciousness: awake  Pain score: 0  Airway patency: patent  Nausea & Vomiting: no nausea  Complications: no  Cardiovascular status: hemodynamically stable  Respiratory status: acceptable  Hydration status: stable

## 2021-03-19 NOTE — PROGRESS NOTES
Discharge instructions given to patient and family. Verbalized understanding. VSS. Discharged home with family.

## 2021-03-19 NOTE — ANESTHESIA PROCEDURE NOTES
Peripheral Block    Patient location during procedure: OR  Start time: 3/19/2021 12:00 PM  End time: 3/19/2021 12:10 PM  Staffing  Performed: anesthesiologist and other anesthesia staff   Anesthesiologist: Radha Chinchilla MD  Other anesthesia staff: Jovanny Parikh RN  Preanesthetic Checklist  Completed: patient identified, IV checked, site marked, risks and benefits discussed, surgical consent, monitors and equipment checked, pre-op evaluation, timeout performed, anesthesia consent given, oxygen available and patient being monitored  Peripheral Block  Patient position: supine  Prep: alcohol swabs  Patient monitoring: cardiac monitor, continuous pulse ox, frequent blood pressure checks and IV access  Block type: Brachial plexus  Laterality: left  Injection technique: single-shot  Guidance: ultrasound guided  Infiltration strength: 0 %  Dose: 0 mL  Interscalene  Provider prep: mask, sterile gloves and sterile gown  Needle  Needle type: short-bevel   Needle gauge: 21 G  Needle length: 5 cm  Needle localization: ultrasound guidance  Test dose: negative  Assessment  Injection assessment: negative aspiration for heme, no paresthesia on injection and local visualized surrounding nerve on ultrasound  Paresthesia pain: none  Slow fractionated injection: yes  Hemodynamics: stable  Additional Notes  Mixture of 0.5% Ropivacaine 30 mL with Decadron 4 mg injected in incremental doses of 5 mL after negative blood aspiration.   Medications Administered  Ropivacaine (NAROPIN) 0.5% injection, 20 mL  Reason for block: post-op pain management and at surgeon's request

## 2021-03-29 DIAGNOSIS — M75.40 IMPINGEMENT SYNDROME OF SHOULDER REGION, UNSPECIFIED LATERALITY: Primary | ICD-10-CM

## 2021-04-01 DIAGNOSIS — S46.012A TRAUMATIC COMPLETE TEAR OF LEFT ROTATOR CUFF, INITIAL ENCOUNTER: Primary | ICD-10-CM

## 2021-04-02 ENCOUNTER — OFFICE VISIT (OUTPATIENT)
Dept: ORTHOPEDIC SURGERY | Age: 56
End: 2021-04-02

## 2021-04-02 VITALS — BODY MASS INDEX: 24.55 KG/M2 | WEIGHT: 162 LBS | HEIGHT: 68 IN

## 2021-04-02 DIAGNOSIS — M75.42 IMPINGEMENT SYNDROME OF LEFT SHOULDER: Primary | ICD-10-CM

## 2021-04-02 PROCEDURE — 99024 POSTOP FOLLOW-UP VISIT: CPT | Performed by: NURSE PRACTITIONER

## 2021-04-06 ENCOUNTER — EVALUATION (OUTPATIENT)
Dept: PHYSICAL THERAPY | Age: 56
End: 2021-04-06
Payer: COMMERCIAL

## 2021-04-06 DIAGNOSIS — M75.42 IMPINGEMENT SYNDROME OF LEFT SHOULDER: ICD-10-CM

## 2021-04-06 DIAGNOSIS — S46.912A STRAIN OF LEFT SHOULDER, INITIAL ENCOUNTER: Primary | ICD-10-CM

## 2021-04-06 PROCEDURE — 97161 PT EVAL LOW COMPLEX 20 MIN: CPT | Performed by: PHYSICAL THERAPIST

## 2021-04-06 PROCEDURE — 97110 THERAPEUTIC EXERCISES: CPT | Performed by: PHYSICAL THERAPIST

## 2021-04-06 NOTE — PROGRESS NOTES
[address] [facility] OUTPATIENT REHABILITATION  PHYSICAL THERAPY INITIAL EVALUATION         Date:  2021   Patient: Dima Lobato  : 1965  MRN: [id number]  Referring Provider: Mikael Penn [facility]  [facility]     Medical Diagnosis:   U45.866V (ICD-10-CM) - Strain of muscle(s) and tendon(s) of the rotator cuff of left shoulder, initial encounter   25 [id number] (ICD-10-CM) - Strain of muscle(s) and tendon(s) of the rotator cuff of right shoulder, initial encounter   M75.42 (ICD-10-CM) - Impingement syndrome of left shoulder   M75.41 (ICD-10-CM) - Impingement syndrome of right shoulder        SUBJECTIVE:     Onset date: , L mahsald SAD 3/19/21 - 3 weeks     Onset[de-identified] Sudden onset    Mechanism of Injury / History: fell. Patient is right handed. Previous PT: yes - helped    Chief complaint: inability / limited ability to use arm    Behavior: condition is getting better    Pain: intermittent  Current: 0/10     Best: 0/10     Worst:10/10    Symptom Type/Quality: aching  Location[de-identified] bicep      Aggravated by: reaching overhead, reaching behind back    Relieved by: rest, placing arm in sling position       Imaging results: Xr Shoulder Left (min 2 Views)    Result Date: 2021  EXAMINATION: THREE XRAY VIEWS OF THE LEFT SHOULDER 2021 9:35 am COMPARISON: None. HISTORY: ORDERING SYSTEM PROVIDED HISTORY: Traumatic complete tear of left rotator cuff, initial encounter TECHNOLOGIST PROVIDED HISTORY: Reason for exam:->pain FINDINGS: There is no fracture dislocation. Glenohumeral articulation is unremarkable.      No acute process       Past Medical History:  Past Medical History:   Diagnosis Date    ADHD     Kidney stones     Work related injury 2020    BILAT torn roatator cuffs ,chest contusion      Past Surgical History:   Procedure Laterality Date    COLONOSCOPY      FRACTURE SURGERY      FOOT   age 15  caught his foot in [address] Bilateral     arthroscopy    SHOULDER ARTHROSCOPY Right 12/04/2020    rotator cuff repair, subacromial decompression and debridement    SHOULDER ARTHROSCOPY Right 12/04/2020    RIGHT SHOULDER ARTHROSCOPY, ROTATOR CUFF REPAIR, SUBACROMIAL DECOMPRESSION (CPT 72771) performed by Margy Madrigal DO at [address] ARTHROSCOPY Left 03/19/2021    LEFT SHOULDER ARTHROSCOPY SAD DEBRIDEMENT     SHOULDER ARTHROSCOPY Left 03/19/2021    LEFT SHOULDER ARTHROSCOPY SAD AND DEBRIDEMENT     SHOULDER ARTHROSCOPY Left 3/19/2021    LEFT SHOULDER ARTHROSCOPY, SUBACROMIAL DECOMPRESSION,  ROTATOR CUFF REPAIR AND DEBRIDEMENT (CPT 74625) ([address]) performed by Margy Madrigal DO at [facility]       Medications:   Current Outpatient Medications   Medication Sig Dispense Refill    amphetamine-dextroamphetamine (ADDERALL, 10MG,) 10 MG tablet Take 10 mg by mouth daily.  oxyCODONE (OXYCONTIN) 30 MG T12A extended release tablet Take 30 mg by mouth every 8 hours as needed. No current facility-administered medications for this visit. 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 Status: full time. Exercise regimen: just bought Pilates machine    Hobbies: none    Patient Goals: return to work    Precautions/Contraindications: recent surgery    OBJECTIVE:     Observations: well nourished male    Inspection: . Incision: edges approximated, no purulent drainage, no redness, no swelling, no tenderness and not hot to touch.                    Palpation: No tenderness     Joint/Motion:  Right Shoulder:  AROM: 160° Forward elevation,  80° ER,  IR to T9  PROM: 170° Forward elevation,  90° ER,  ***° IR    Left Shoulder:  AROM: 160° Forward elevation,  80° ER,  IR to T11  PROM: 170° Forward elevation,  90° ER , ***° IR    Strength:  Right Shoulder: Flexion 5/5,  Abduction 5/5, ER 5/5, IR 5/5      Left Shoulder: Flexion 5-/5, Abduction 5-/5, ER 4+/5, IR 5-/5       Special Tests/Functional Screens:    [] Tiffany []+ / [] -  [] [place]'s []+ / [] -   []  Faiza's []+ / [] -    []GH drawer []+ / [] -    [] Bicep Load []+ / [] -   [] Crank []+ / [] -  [] [place] []+ / [] -   [] Elbow Varus []+ / [] -  [] Neer's []+ / [] -      [] Speed's []+ / [] -   []Sulcus Sign []+ / [] -   [] Apprehension []+ / [] -   [] Bicep Load II []+ / [] -   [] Ciro Acosta []+ / [] -   [] Elbow Valgus []+ / [] -     [] Other: []+ / [] -         ASSESSMENT     Outcome Measure:   QuickDASH (Disorders of the Arm, Shoulder, and Hand) ***% disability    Problems:    Pain reported ***/10   ROM decreased   Strength decreased   Decreased functional ability with {functionallimitations:11011}    [x] There are no barriers affecting plan of care or recovery    [] Barriers to this patient's plan of care or recovery include.     Domestic Concerns:  [x] No  [] Yes:    Short Term goals ({stggoals:18919})   Decrease reported pain to ***/10   Increase ROM to ***   Increase Strength to ***    Able to perform/complete the following functions/tasks: ***   QuickDASH (Disorders of the Arm, Shoulder, and Hand) ***% disability    Long Term goals ({ltggoals:90909})   Decrease reported pain to ***/10   Increase ROM to ***   Increase Strength to ***    Able to perform/complete the following functions/tasks: ***   QuickDASH ***% disability   Independent with Home Exercise Programs    Rehab Potential: [x] Good  [] Fair  [] Poor    PLAN       Treatment Plan: ***  [x] Therapeutic Exercise  [x] Therapeutic Activity  [x] Neuromuscular Re-education   [] Gait Training  [] Balance Training  [] Aerobic conditioning  [] Manual Therapy  [] Massage/Fascial release   [] Work/Sport specific activities    [] Pain Neuroscience [x] Cold/hotpack  [] Vasocompression  [x] Electrical Stimulation  [] Lumbar/Cervical Traction  [] Ultrasound   [] Iontophoresis: 4 mg/mL Dexamethasone Sodium Phosphate 40-80 mAmin        [x] Instruction in HEP      []  Medication allergies reviewed for use of Dexamethasone Sodium Phosphate 4mg/ml  with iontophoresis treatments. Patient is not allergic. The following CPT codes are likely to be used in the care of this patient: {codes:08465}      Suggested Professional Referral: [x] No  [] Yes:     Patient Education:  [x] Plans/Goals, Risks/Benefits discussed  [x] Home exercise program  Method of Education: [x] Verbal  [x] Demo  [x] Written  Comprehension of Education:  [x] Verbalizes understanding. [x] Demonstrates understanding. [] Needs Review. [] Demonstrates/verbalizes understanding of HEP/Ed previously given. Frequency:  {daysperweek:14312} for {numberofweeks:56744}    Patient understands diagnosis/prognosis and consents to treatment, plan and goals: [x] Yes    [] No     Thank you for the opportunity to work with your patient. If you have questions or comments, please contact me at [phone]; fax: [phone]. Electronically signed by: Merna Hugo, PT    Medicare Patients Only     Please sign Physician's Certification and return to: [address] [address]  Dept: [phone]  Dept Fax: [phone] Certification / Comments     Frequency/Duration {daysperweek:96573} for {numberofweeks:34514}. Certification period from 4/6/2021  to ***. I have reviewed the Plan of Care established for skilled therapy services and certify that the services are required and that they will be provided while the patient is under my care.     Physician's Comments/Revisions:               Physician's Printed Name:                                           [de-identified] Signature:                                                               Date:

## 2021-04-06 NOTE — PROGRESS NOTES
[facility]  Physical Therapy Daily Treatment Note    Date: 2021  Patient Name: Carlos A Orta  : 1965   MRN: [id number]  DOInjury: 2021  DOSx: 3/19/2021   Referring Provider: Nikolay Hernandez DO  [id number] [facility]     Medical Diagnosis:   M75.42 (ICD-10-CM) - Impingement syndrome of left shoulder  S46.012A (ICD-10-CM) - Strain of muscle(s) and tendon(s) of the rotator cuff of left shoulder, initial encounter    Outcome Measure:  QuickDASH (Disorders of the Arm, Shoulder, and Hand) 50% disability    S: see eval  O: Pt given written HEP  Time 6162-1071     Visit 1 Repeat outcome measure at mid point and end. Pain 0/10 at rest 5 /10 at end range     ROM      Modalities            Manual                  Stretch      Table slides      Wall Flexion      Wall ER stretch      Towel IR stretch      IR towel behind back 2 x 15     Exercise      Shrugs AROM      Pendulum Ex      UBE      Pulleys - flex      Pulleys-IR      Supine wand chest press      Supine wand flex 2 x 15     Supine wand ER/IR 2 x 15     Supine flexion      S-lying ER      Standing wand flex      Standing flexion            ROWS: H Functional activities  To aid in ROM and strength needed for reaching , lifting ,pushing and pulling at home/work    ROWS: M  \"    ROWS: L  \"    ER  \"    IR  \"                A:  Tolerated well. Above added to written HEP.   P: Continue with rehab plan  Rich Bhatt PT    Treatment Charges: Mins Units   Initial Evaluation 20 1   Re-Evaluation     Ther Exercise         TE 25 2   Manual Therapy     MT     Ther Activities        TA     Gait Training          GT     Neuro Re-education NR     Modalities     Non-Billable Service Time     Other     Total Time/Units 45 3

## 2021-04-06 NOTE — PROGRESS NOTES
[address] [facility] OUTPATIENT REHABILITATION  PHYSICAL THERAPY INITIAL EVALUATION         Date:  2021   Patient: Sharath Nolasco  : 1965  MRN: [id number]  Referring Provider: Felecia Rogel [address] [place] [address] [facility]     Medical Diagnosis:   W16.345K (ICD-10-CM) - Strain of muscle(s) and tendon(s) of the rotator cuff of left shoulder, initial encounter   25 580865 (ICD-10-CM) - Strain of muscle(s) and tendon(s) of the rotator cuff of right shoulder, initial encounter   M75.42 (ICD-10-CM) - Impingement syndrome of left shoulder   M75.41 (ICD-10-CM) - Impingement syndrome of right shoulder        SUBJECTIVE:     Onset date: , L mahsald SAD 3/19/21 - 3 weeks     Onset[de-identified] Sudden onset    Mechanism of Injury / History: fell. Patient is right handed. Previous PT: yes - helped    Chief complaint: inability / limited ability to use arm    Behavior: condition is getting better    Pain: intermittent  Current: 0/10     Best: 0/10     Worst:10/10    Symptom Type/Quality: aching  Location[de-identified] bicep      Aggravated by: reaching overhead, reaching behind back    Relieved by: rest, placing arm in sling position       Imaging results: Xr Shoulder Left (min 2 Views)    Result Date: 2021  EXAMINATION: THREE XRAY VIEWS OF THE LEFT SHOULDER 2021 9:35 am COMPARISON: None. HISTORY: ORDERING SYSTEM PROVIDED HISTORY: Traumatic complete tear of left rotator cuff, initial encounter TECHNOLOGIST PROVIDED HISTORY: Reason for exam:->pain FINDINGS: There is no fracture dislocation. Glenohumeral articulation is unremarkable.      No acute process       Past Medical History:  Past Medical History:   Diagnosis Date    ADHD     Kidney stones     Work related injury 2020    BILAT torn roatator cuffs ,chest contusion      Past Surgical History:   Procedure Laterality Date    COLONOSCOPY      FRACTURE SURGERY      FOOT   age 15  caught his foot in [address] Bilateral     arthroscopy    SHOULDER ARTHROSCOPY Right 12/04/2020    rotator cuff repair, subacromial decompression and debridement    SHOULDER ARTHROSCOPY Right 12/04/2020    RIGHT SHOULDER ARTHROSCOPY, ROTATOR CUFF REPAIR, SUBACROMIAL DECOMPRESSION (CPT 90942) performed by Bala Butts DO at [address] ARTHROSCOPY Left 03/19/2021    LEFT SHOULDER ARTHROSCOPY SAD DEBRIDEMENT     SHOULDER ARTHROSCOPY Left 03/19/2021    LEFT SHOULDER ARTHROSCOPY SAD AND DEBRIDEMENT     SHOULDER ARTHROSCOPY Left 3/19/2021    LEFT SHOULDER ARTHROSCOPY, SUBACROMIAL DECOMPRESSION,  ROTATOR CUFF REPAIR AND DEBRIDEMENT (CPT 23117) ([address]) performed by Bala Butts DO at [address] [facility]       Medications:   Current Outpatient Medications   Medication Sig Dispense Refill    amphetamine-dextroamphetamine (ADDERALL, 10MG,) 10 MG tablet Take 10 mg by mouth daily.  oxyCODONE (OXYCONTIN) 30 MG T12A extended release tablet Take 30 mg by mouth every 8 hours as needed. No current facility-administered medications for this visit. 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 Status: full time. Exercise regimen: just bought Pilates machine    Hobbies: none    Patient Goals: return to work    Precautions/Contraindications: recent surgery    OBJECTIVE:     Observations: well nourished male    Inspection: . Incision: edges approximated, no purulent drainage, no redness, no swelling, no tenderness and not hot to touch.                    Palpation: No tenderness     Joint/Motion:  Right Shoulder:  AROM: 160° Forward elevation,  80° ER,  IR to T9  PROM: 170° Forward elevation,  90° ER,  70° IR    Left Shoulder:  AROM: 160° Forward elevation,  80° ER,  IR to T11  PROM: 170° Forward elevation,  90° ER , 70° IR    Strength:  Right Shoulder: Flexion 5/5,  Abduction 5/5, ER 5/5, IR 5/5      Left Shoulder: Flexion 5-/5, Abduction 5-/5, ER 4+/5, IR 5-/5       Special Tests/Functional Screens:    [] Tiffany []+ / [] -  [] Winston's []+ / [] -   []  Faiza's []+ / [] -    []GH drawer []+ / [] -    [] Bicep Load []+ / [] -   [] Crank []+ / [] -  [] [place] []+ / [] -   [] Elbow Varus []+ / [] -  [] Neer's []+ / [] -      [] Speed's []+ / [] -   []Sulcus Sign []+ / [] -   [] Apprehension []+ / [] -   [] Bicep Load II []+ / [] -   [] Claudell Matte []+ / [] -   [] Elbow Valgus []+ / [] -     [] Other: []+ / [] -         ASSESSMENT     Outcome Measure:   QuickDASH (Disorders of the Arm, Shoulder, and Hand) 50% disability    Problems:    Pain reported 10/10   ROM decreased   Strength decreased   Decreased functional ability with work, use of left upper extremity    [x] There are no barriers affecting plan of care or recovery    [] Barriers to this patient's plan of care or recovery include.     Domestic Concerns:  [x] No  [] Yes:    Short Term goals (2-3 weeks)   Decrease reported pain to 4/10   Increase ROM to symmetrical   QuickDASH (Disorders of the Arm, Shoulder, and Hand) 35% disability    Long Term goals (4-6 weeks)   Decrease reported pain to 2/10   Increase ROM to symmetrical w/o pain   Increase Strength to 5/5    Able to perform/complete the following functions/tasks: return to work   QuickDASH 20% disability   Independent with Home Exercise Programs    Rehab Potential: [x] Good  [] Fair  [] Poor    PLAN       Treatment Plan: ROM and strengthening to tolerance  [x] Therapeutic Exercise  [x] Therapeutic Activity  [x] Neuromuscular Re-education   [] Gait Training  [] Balance Training  [] Aerobic conditioning  [] Manual Therapy  [] Massage/Fascial release   [x] Work/Sport specific activities    [] Pain Neuroscience [x] Cold/hotpack  [] Vasocompression  [x] Electrical Stimulation  [] Lumbar/Cervical Traction  [] Ultrasound   [] Iontophoresis: 4 mg/mL Dexamethasone Sodium Phosphate 40-80 mAmin        [x] Instruction in HEP []  Medication allergies reviewed for use of Dexamethasone Sodium Phosphate 4mg/ml  with iontophoresis treatments. Patient is not allergic. The following CPT codes are likely to be used in the care of this patient: 96733 Therapeutic Exercise , 14998 Therapeutic Activities , 75064 Manual Therapy       Suggested Professional Referral: [x] No  [] Yes:     Patient Education:  [x] Plans/Goals, Risks/Benefits discussed  [x] Home exercise program  Method of Education: [x] Verbal  [x] Demo  [x] Written  Comprehension of Education:  [x] Verbalizes understanding. [x] Demonstrates understanding. [] Needs Review. [] Demonstrates/verbalizes understanding of HEP/Ed previously given. Frequency:  3 days per week for 6 weeks    Patient understands diagnosis/prognosis and consents to treatment, plan and goals: [x] Yes    [] No     Thank you for the opportunity to work with your patient. If you have questions or comments, please contact me at [phone]; fax: [phone]. Electronically signed by: Alvia Paget, PT    Medicare Patients Only     Please sign Physician's Certification and return to: Sung Ramirez [address] [place] [address]  Dept: [phone]  Dept Fax: [phone] Certification / Comments     Frequency/Duration 3 days per week for 6 weeks. Certification period from 4/6/2021  to 7/1/2021. I have reviewed the Plan of Care established for skilled therapy services and certify that the services are required and that they will be provided while the patient is under my care.     Physician's Comments/Revisions:               Physician's Printed Name:                                           [de-identified] Signature:                                                               Date:

## 2021-04-12 ENCOUNTER — TELEPHONE (OUTPATIENT)
Dept: PHYSICAL THERAPY | Age: 56
End: 2021-04-12

## 2021-04-14 ENCOUNTER — TREATMENT (OUTPATIENT)
Dept: PHYSICAL THERAPY | Age: 56
End: 2021-04-14
Payer: COMMERCIAL

## 2021-04-14 DIAGNOSIS — M75.42 IMPINGEMENT SYNDROME OF LEFT SHOULDER: ICD-10-CM

## 2021-04-14 DIAGNOSIS — M75.121 NONTRAUMATIC COMPLETE TEAR OF RIGHT ROTATOR CUFF: ICD-10-CM

## 2021-04-14 PROCEDURE — 97110 THERAPEUTIC EXERCISES: CPT | Performed by: PHYSICAL THERAPIST

## 2021-04-14 NOTE — PROGRESS NOTES
[facility]  Physical Therapy Daily Treatment Note    Date: 2021  Patient Name: Raúl Finn  : 1965   MRN: [id number]  DOInjury: 2021  DOSx: 3/19/2021   Referring Provider: Tami Rodriguez DO     Medical Diagnosis:   M75.42 (ICD-10-CM) - Impingement syndrome of left shoulder  S46.012A (ICD-10-CM) - Strain of muscle(s) and tendon(s) of the rotator cuff of left shoulder, initial encounter    Outcome Measure:  QuickDASH (Disorders of the Arm, Shoulder, and Hand) 50% disability    S: no new complaints, states he hasn't been doing his exercises at home, doesn't know wht  O: moving well  Time 8425-9099     Visit 2 Repeat outcome measure at mid point and end. Pain 0/10 at rest 4 /10 at end range     ROM      Modalities            Manual                  Stretch      Table slides      Wall Flexion      Wall ER stretch      Towel IR stretch      IR towel behind back 2 x 15     Exercise      Shrugs AROM      Pendulum Ex      UBE      Pulleys - flex 5 min     Pulleys-IR      Supine wand chest press      Supine wand flex 2 x 15     Supine wand ER/IR 2 x 15     Supine flexion      S-lying ER      Standing wand press 2 x 15     Standing flexion            ROWS: H      ROWS: M      ROWS: L      ER Red 3 x 10 superset    IR Red 3 x 10 superset                A:  Tolerated well. Above added to written HEP.   P: Continue with rehab plan  Divya Wright, PT    Treatment Charges: Mins Units   Initial Evaluation     Re-Evaluation     Ther Exercise         TE 40 3   Manual Therapy     MT     Ther Activities        TA     Gait Training          GT     Neuro Re-education NR     Modalities     Non-Billable Service Time     Other     Total Time/Units 40 3

## 2021-04-20 ENCOUNTER — TREATMENT (OUTPATIENT)
Dept: PHYSICAL THERAPY | Age: 56
End: 2021-04-20
Payer: COMMERCIAL

## 2021-04-20 DIAGNOSIS — S46.912A STRAIN OF LEFT SHOULDER, INITIAL ENCOUNTER: ICD-10-CM

## 2021-04-20 DIAGNOSIS — M75.42 IMPINGEMENT SYNDROME OF LEFT SHOULDER: Primary | ICD-10-CM

## 2021-04-20 PROCEDURE — 97110 THERAPEUTIC EXERCISES: CPT | Performed by: PHYSICAL THERAPIST

## 2021-04-20 NOTE — PROGRESS NOTES
[facility]  Physical Therapy Daily Treatment Note    Date: 2021  Patient Name: Susanne Melendez  : 1965   MRN: [id number]  DOInjury: 2021  DOSx: 3/19/2021   Referring Provider: Veronica Reece Diagnosis:   M75.42 (ICD-10-CM) - Impingement syndrome of left shoulder  S46.012A (ICD-10-CM) - Strain of muscle(s) and tendon(s) of the rotator cuff of left shoulder, initial encounter    Outcome Measure:  QuickDASH (Disorders of the Arm, Shoulder, and Hand) 50% disability    S: no new complaints O: moving well  Time 0835- 0900 am Pt arrived late on 2021    Visit 3/  Repeat outcome measure at mid point and end. Pain 0/10 at rest 4 /10 at end range     ROM      Modalities            Manual                  Stretch      Table slides      Wall Flexion      Wall ER stretch      Towel IR stretch      IR towel behind back 2 x 15  te   Exercise      Shrugs AROM      Pendulum Ex      UBE      Pulleys - flex 5 min  te   Pulleys-IR            Supine wand chest press 2 x 15   te   Supine wand flex 2 x 15  te   Supine wand ER/IR 2 x 15  te   Supine flexion      S-lying ER      Standing wand press 2 x 15  te   Standing flexion            ROWS: H      ROWS: M      ROWS: L      ER Red 3 x 10 superset te   IR Red 3 x 10 superset te               A:  Tolerated well. Above added to written HEP.   P: Continue with rehab plan  Bea Saunders PTA    Treatment Charges: Mins Units   Initial Evaluation     Re-Evaluation     Ther Exercise         TE 25 2   Manual Therapy     MT     Ther Activities        TA     Gait Training          GT     Neuro Re-education NR     Modalities     Non-Billable Service Time     Other     Total Time/Units 25 2

## 2021-04-21 ENCOUNTER — TELEPHONE (OUTPATIENT)
Dept: PHYSICAL THERAPY | Age: 56
End: 2021-04-21

## 2021-04-22 ENCOUNTER — TREATMENT (OUTPATIENT)
Dept: PHYSICAL THERAPY | Age: 56
End: 2021-04-22
Payer: COMMERCIAL

## 2021-04-22 DIAGNOSIS — M75.121 NONTRAUMATIC COMPLETE TEAR OF RIGHT ROTATOR CUFF: ICD-10-CM

## 2021-04-22 DIAGNOSIS — M75.42 IMPINGEMENT SYNDROME OF LEFT SHOULDER: Primary | ICD-10-CM

## 2021-04-22 DIAGNOSIS — S46.912A STRAIN OF LEFT SHOULDER, INITIAL ENCOUNTER: ICD-10-CM

## 2021-04-22 PROCEDURE — 97110 THERAPEUTIC EXERCISES: CPT | Performed by: PHYSICAL THERAPIST

## 2021-04-22 NOTE — PROGRESS NOTES
[facility]  Physical Therapy Daily Treatment Note    Date: 2021  Patient Name: Denise Falcon  : 1965   MRN: [id number]  DOInjury: 2021  DOSx: 3/19/2021   Referring Provider: Jennifer Patel DO     Medical Diagnosis:   M75.42 (ICD-10-CM) - Impingement syndrome of left shoulder  S46.012A (ICD-10-CM) - Strain of muscle(s) and tendon(s) of the rotator cuff of left shoulder, initial encounter    Outcome Measure:  QuickDASH (Disorders of the Arm, Shoulder, and Hand) 50% disability    S: reports shoulder is feeling better but L side of pelvis has been irritated for the past 4 days  O: AROM is grossly WNL and symmetrical  Time 1812-5438 Pt arrived late on 2021    Visit 4/  Repeat outcome measure at mid point and end. Pain 4/10 shld, 8/10 pelvis     ROM      Modalities            Manual                  Stretch      Table slides      Wall Flexion      Wall ER stretch      Towel IR stretch      IR towel behind back 2 x 15  te   Exercise      Shrugs AROM      Pendulum Ex      UBE 2/2 min     Pulleys - flex 5 min  te   Pulleys-IR            Supine wand chest press 2 x 15   te   Supine wand flex 2 x 15  te   Supine wand ER/IR 2 x 15  te   Supine flexion      S-lying ER      Standing wand press 2 x 15  te   Standing flexion            ROWS: H Green 2 x 15     ROWS: M Green 2 x 15     ROWS: L Green 2 x 15     ER Red 3 x 10 superset te   IR Red 3 x 10 superset te               A:  Tolerated well. Above added to written HEP.   P: Continue with rehab plan  Juliette Lucia PT    Treatment Charges: Mins Units   Initial Evaluation     Re-Evaluation     Ther Exercise         TE 40 3   Manual Therapy     MT     Ther Activities        TA     Gait Training          GT     Neuro Re-education NR     Modalities     Non-Billable Service Time     Other     Total Time/Units 40 3

## 2021-04-23 PROBLEM — S46.912A STRAIN OF LEFT SHOULDER: Status: ACTIVE | Noted: 2020-09-17

## 2021-04-29 ENCOUNTER — TELEPHONE (OUTPATIENT)
Dept: PHYSICAL THERAPY | Age: 56
End: 2021-04-29

## 2021-04-30 ENCOUNTER — OFFICE VISIT (OUTPATIENT)
Dept: SURGERY | Age: 56
End: 2021-04-30
Payer: COMMERCIAL

## 2021-04-30 ENCOUNTER — OFFICE VISIT (OUTPATIENT)
Dept: ORTHOPEDIC SURGERY | Age: 56
End: 2021-04-30

## 2021-04-30 VITALS
OXYGEN SATURATION: 87 % | BODY MASS INDEX: 25.67 KG/M2 | TEMPERATURE: 97.5 F | SYSTOLIC BLOOD PRESSURE: 124 MMHG | HEART RATE: 70 BPM | WEIGHT: 169.4 LBS | RESPIRATION RATE: 20 BRPM | DIASTOLIC BLOOD PRESSURE: 82 MMHG | HEIGHT: 68 IN

## 2021-04-30 VITALS — BODY MASS INDEX: 25.61 KG/M2 | TEMPERATURE: 98 F | WEIGHT: 169 LBS | HEIGHT: 68 IN

## 2021-04-30 DIAGNOSIS — M75.42 IMPINGEMENT SYNDROME OF LEFT SHOULDER: Primary | ICD-10-CM

## 2021-04-30 DIAGNOSIS — S22.32XA CLOSED FRACTURE OF ONE RIB OF LEFT SIDE, INITIAL ENCOUNTER: Primary | ICD-10-CM

## 2021-04-30 PROCEDURE — 99214 OFFICE O/P EST MOD 30 MIN: CPT | Performed by: PLASTIC SURGERY

## 2021-04-30 PROCEDURE — 99024 POSTOP FOLLOW-UP VISIT: CPT | Performed by: NURSE PRACTITIONER
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
## 2021-05-10 ENCOUNTER — TELEPHONE (OUTPATIENT)
Dept: ORTHOPEDIC SURGERY | Age: 56
End: 2021-05-10

## 2021-05-10 NOTE — TELEPHONE ENCOUNTER
Call placed to patient and appointment scheduled at this time.   Pending C-9 Approval.    Future Appointments   Date Time Provider Maria Guadalupe Bernal   6/3/2021 10:15 AM Marci Lockwood DO [facility]   6/10/2021  9:30 AM Aguila Pantoja DO [address]
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
## 2021-05-28 DIAGNOSIS — S32.302A CLOSED FRACTURE OF LEFT ILIAC CREST, INITIAL ENCOUNTER (HCC): Primary | ICD-10-CM

## 2021-06-03 ENCOUNTER — HOSPITAL ENCOUNTER (OUTPATIENT)
Dept: GENERAL RADIOLOGY | Age: 56
Discharge: HOME | End: 2021-06-05
Payer: COMMERCIAL

## 2021-06-03 ENCOUNTER — OFFICE VISIT (OUTPATIENT)
Dept: ORTHOPEDIC SURGERY | Age: 56
End: 2021-06-03
Payer: COMMERCIAL

## 2021-06-03 VITALS — TEMPERATURE: 98.3 F

## 2021-06-03 DIAGNOSIS — S32.312D: Primary | ICD-10-CM

## 2021-06-03 DIAGNOSIS — S32.302A CLOSED FRACTURE OF LEFT ILIAC CREST, INITIAL ENCOUNTER (HCC): ICD-10-CM

## 2021-06-03 PROCEDURE — 99212 OFFICE O/P EST SF 10 MIN: CPT

## 2021-06-03 PROCEDURE — 72190 X-RAY EXAM OF PELVIS: CPT

## 2021-06-03 PROCEDURE — 99213 OFFICE O/P EST LOW 20 MIN: CPT | Performed by: PHYSICIAN ASSISTANT
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
## 2021-06-10 ENCOUNTER — OFFICE VISIT (OUTPATIENT)
Dept: ORTHOPEDIC SURGERY | Age: 56
End: 2021-06-10

## 2021-06-10 VITALS — WEIGHT: 169 LBS | HEIGHT: 68 IN | BODY MASS INDEX: 25.61 KG/M2 | TEMPERATURE: 98 F

## 2021-06-10 DIAGNOSIS — M75.42 SHOULDER IMPINGEMENT, LEFT: ICD-10-CM

## 2021-06-10 DIAGNOSIS — S46.812D TRAUMATIC RUPTURE OF SUPRASPINATUS TENDON OF LEFT SHOULDER, SUBSEQUENT ENCOUNTER: Primary | ICD-10-CM

## 2021-06-10 DIAGNOSIS — S46.012A TRAUMATIC COMPLETE TEAR OF LEFT ROTATOR CUFF, INITIAL ENCOUNTER: ICD-10-CM

## 2021-06-10 PROCEDURE — 99024 POSTOP FOLLOW-UP VISIT: CPT | Performed by: ORTHOPAEDIC SURGERY

## 2021-06-10 RX ORDER — CELECOXIB 200 MG/1
200 CAPSULE ORAL DAILY
Qty: 30 CAPSULE | Refills: 3 | Status: ON HOLD
Start: 2021-06-10 | End: 2021-11-17

## 2021-06-16 ENCOUNTER — TELEPHONE (OUTPATIENT)
Dept: ORTHOPEDIC SURGERY | Age: 56
End: 2021-06-16

## 2021-06-16 NOTE — TELEPHONE ENCOUNTER
Called patient scheduled for Friday 6/18  release would be from the pelvis fracture, Dr. Wilner Avalos would provide recommendations regarding shoulders.

## 2021-06-16 NOTE — TELEPHONE ENCOUNTER
Patient calling for an appt preferably next week. Needs to be seen so he can be released to work part time.

## 2021-06-16 NOTE — TELEPHONE ENCOUNTER
If he can come in Friday we could see him, out release would be from the pelvis fracture, Dr. Sofy Willard would provide recommendations regarding shoulders  Electronically signed by Adria Bhatia PA-C on 6/16/2021 at 11:41 AM

## 2021-06-18 ENCOUNTER — OFFICE VISIT (OUTPATIENT)
Dept: ORTHOPEDIC SURGERY | Age: 56
End: 2021-06-18
Payer: COMMERCIAL

## 2021-06-18 VITALS — TEMPERATURE: 98.2 F

## 2021-06-18 DIAGNOSIS — S32.312D: Primary | ICD-10-CM

## 2021-06-18 PROCEDURE — 99212 OFFICE O/P EST SF 10 MIN: CPT | Performed by: ORTHOPAEDIC SURGERY

## 2021-06-18 NOTE — PROGRESS NOTES
Chief Complaint   Patient presents with    Fracture      left ASIS        SUBJECTIVE:   Patient reports he is feeling about the same as he was when he was evaluated 2 weeks ago. Reports that he was unable to participate in physical therapy for his left lower extremity secondary to paperwork issues. Patient returns today for return to work recommendations and questions about physical therapy his left lower extremity. Denies any new complaints. Denies current numbness/tingling/paresthesias to the left lower extremity. Review of Systems -   General ROS: negative for - chills, fatigue, fever or night sweats  Respiratory ROS: no cough, shortness of breath, or wheezing  Cardiovascular ROS: no chest pain or dyspnea on exertion  Gastrointestinal ROS: no abdominal pain, change in bowel habits, or black or bloody stools  Genitourinary: no hematuria, dysuria, or incontinence   Musculoskeletal ROS:see above  Neurological ROS: no TIA or stroke symptoms       OBJECTIVE:   Alert and oriented X 3, no acute distress, respirations easy and unlabored with no audible wheezes, skin warm and dry, speech and dress appropriate for noted age, affect euthymic. Extremity:  · + TTP about the iliac crest  · Compartments soft and compressible  · Calf soft and non tender  · +PF/DF/EHL  · +2/4 DP & PT pulses, Brisk Cap refill, Toes warm and perfused  · Distal sensation grossly intact to Peroneals, Sural, Saphenous, and tibial nrs    XR: 6/18/21 x-ray AP pelvis/left hip: Fracture of the left iliac crest remains displaced but continues to consolidate compared to previous radiographs. No interval change compared to previous radiographs. No other fractures or dislocations appreciated.     Temp 98.2 °F (36.8 °C)     ASSESSMENT:  1.Closed displaced avulsion fracture of left ilium with routine healing, subsequent encounter     PLAN:  Weightbearing as tolerated left lower extremity  Pain control per PM  PT order placed again today  Okay to return to work full duty  Follow-up as needed    [address] Orders Needing C-9 Authorization: Physical Therapy 2-3 times per week focusing on LE ROM, stretching, strengthening, and proprioception. [employer]-14/Work Status: Released to work full duty no restrictions. Ease back into heavy labor tasks  Patient Progressing: Progressing as expected  Patient candidate for Vocational Rehab at this time: No  Patient determined to be MMI at this visit: No    Electronically signed by Kimberly Chavis DO on 6/18/2021 at 8:54 AM  Note: This report was completed using [employer] voiced recognition software. Every effort has been made to ensure accuracy; however, inadvertent computerized transcription errors may be present. Orthopaedic Attending    Case discussed with resident. Agree with assessment and plan. Patient is try to return back to work. Discussed if he is having any issues or concerns he is to contact the office for repeat evaluation.     Electronically signed by   Constance Johnson DO  6/18/2021

## 2021-06-18 NOTE — PROGRESS NOTES
Callie GarciaVale is a 54 y.o. male who presents for follow up of left avulsion fracture left ASIS    SURGEON: Dr. Dori Paniagua, DO  Date of Injury/Surgery: 8-12-20  Date last seen in office: 6-3-21    Symptoms: unchanged  New complaints: Reports continued moderate pain    Weightbearing:  Full weight bearing      Assistive device No Device  Participating in therapy (location if yes)?  Yes, on shoulder only at  [facility] therapy ([place])  Under the care of Pain Management  Refills Needed: None  Order/Referral Needed: no

## 2021-06-24 ENCOUNTER — OFFICE VISIT (OUTPATIENT)
Dept: ORTHOPEDIC SURGERY | Age: 56
End: 2021-06-24
Payer: COMMERCIAL

## 2021-06-24 VITALS — TEMPERATURE: 98 F | HEIGHT: 68 IN | WEIGHT: 169 LBS | BODY MASS INDEX: 25.61 KG/M2

## 2021-06-24 DIAGNOSIS — S46.812D TRAUMATIC RUPTURE OF SUPRASPINATUS TENDON OF LEFT SHOULDER, SUBSEQUENT ENCOUNTER: ICD-10-CM

## 2021-06-24 DIAGNOSIS — S46.012A TRAUMATIC COMPLETE TEAR OF LEFT ROTATOR CUFF, INITIAL ENCOUNTER: Primary | ICD-10-CM

## 2021-06-24 PROCEDURE — 99213 OFFICE O/P EST LOW 20 MIN: CPT | Performed by: ORTHOPAEDIC SURGERY

## 2021-06-24 RX ORDER — AMOXICILLIN AND CLAVULANATE POTASSIUM 875; 125 MG/1; MG/1
TABLET, FILM COATED ORAL
Status: ON HOLD | COMMUNITY
Start: 2021-06-05 | End: 2021-11-17
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
## 2021-07-19 ENCOUNTER — OFFICE VISIT (OUTPATIENT)
Dept: ORTHOPEDIC SURGERY | Age: 56
End: 2021-07-19
Payer: COMMERCIAL

## 2021-07-19 VITALS — WEIGHT: 160 LBS | TEMPERATURE: 98 F | HEIGHT: 68 IN | BODY MASS INDEX: 24.25 KG/M2

## 2021-07-19 DIAGNOSIS — S32.312D: Primary | ICD-10-CM

## 2021-07-19 PROCEDURE — 99214 OFFICE O/P EST MOD 30 MIN: CPT | Performed by: ORTHOPAEDIC SURGERY

## 2021-07-19 NOTE — PROGRESS NOTES
Chief Complaint   Patient presents with    Hip Pain     left hip WC f/u. Left hip was doing ok tbut is hurting alot more feels like a baseball        Naun Heather Pacheco  Is a 54 y.o. Male here today for his left hip WC Claim. He has previous seen Dr. Naga Carter but wanted me to look at his hip today. Past Medical History:   Diagnosis Date    ADHD     Kidney stones     Work related injury 08/2020    BILAT torn roatator cuffs ,chest contusion      Past Surgical History:   Procedure Laterality Date    COLONOSCOPY      FRACTURE SURGERY      FOOT   age 15  caught his foot in [address] Bilateral     arthroscopy    SHOULDER ARTHROSCOPY Right 12/04/2020    rotator cuff repair, subacromial decompression and debridement    SHOULDER ARTHROSCOPY Right 12/04/2020    RIGHT SHOULDER ARTHROSCOPY, ROTATOR CUFF REPAIR, SUBACROMIAL DECOMPRESSION (CPT 64749) performed by Ward Gregorio DO at [address] [facility] ARTHROSCOPY Left 03/19/2021    LEFT SHOULDER ARTHROSCOPY SAD DEBRIDEMENT     SHOULDER ARTHROSCOPY Left 03/19/2021    LEFT SHOULDER ARTHROSCOPY SAD AND DEBRIDEMENT     SHOULDER ARTHROSCOPY Left 3/19/2021    LEFT SHOULDER ARTHROSCOPY, SUBACROMIAL DECOMPRESSION,  ROTATOR CUFF REPAIR AND DEBRIDEMENT (CPT 59305) (ARTHREX) [facility]       Current Outpatient Medications:     amoxicillin-clavulanate (AUGMENTIN) 875-125 MG per tablet, , Disp: , Rfl:     celecoxib (CELEBREX) 200 MG capsule, Take 1 capsule by mouth daily, Disp: 30 capsule, Rfl: 3    amphetamine-dextroamphetamine (ADDERALL, 10MG,) 10 MG tablet, Take 10 mg by mouth daily. , Disp: , Rfl:     oxyCODONE (OXYCONTIN) 30 MG T12A extended release tablet, Take 30 mg by mouth every 8 hours as needed.  , Disp: , Rfl:   Allergies   Allergen Reactions    Iv Dye [Iodides] Anaphylaxis     Iodine dye     Social History     Socioeconomic History    Marital status:      Spouse name: Not on file    Number of children: Not on file    Years of education: Not on file    Highest education level: Not on file   Occupational History    Not on file   Tobacco Use    Smoking status: Former Smoker     Years: 10.00     Types: Cigarettes    Smokeless tobacco: Never Used   Vaping Use    Vaping Use: Never used   Substance and Sexual Activity    Alcohol use: Never    Drug use: Never    Sexual activity: Yes     Partners: Female   Other Topics Concern    Not on file   Social History Narrative    Not on file     Social Determinants of Health     Financial Resource Strain:     Difficulty of Paying Living Expenses:    Food Insecurity:     Worried About Running Out of Food in the Last Year:     [address] in the Last Year:    Transportation Needs:     Lack of Transportation (Medical):  Lack of Transportation (Non-Medical):    Physical Activity:     Days of Exercise per Week:     Minutes of Exercise per Session:    Stress:     Feeling of Stress :    Social Connections:     Frequency of Communication with Friends and Family:     Frequency of Social Gatherings with Friends and Family:     Attends Shinto Services:     Active Member of Clubs or Organizations:     Attends Club or Organization Meetings:     Marital Status:    Intimate Partner Violence:     Fear of Current or Ex-Partner:     Emotionally Abused:     Physically Abused:     Sexually Abused:      Family History   Problem Relation Age of Onset    No Known Problems Mother     Heart Failure Father          REVIEW OF SYSTEMS:     General/Constitution:  (-)weight loss, (-)fever, (-)chills, (-)weakness. Skin: (-) rash,(-) psoriasis,(-) eczema, (-)skin cancer. Musculoskeletal: (-) fractures,  (-) dislocations,(-) collagen vascular disease, (-) fibromyalgia, (-) multiple sclerosis, (-) muscular dystrophy, (-) RSD,(-) joint pain (-)swelling, (-) joint pain,swelling.   Neurologic: (-) epilepsy, (-)seizures,(-) brain tumor,(-) TIA, (-)stroke, Examination of the digits and nails reveal no cyanosis or clubbing    Lumbar exam:  On visual inspection, there is not deformity of the spine. full range of motion, no tenderness, palpable spasm or pain on motion. Special tests: Straight Leg Raise negative, Luisa test negative. Hip exam:  Upon visual inspection there is a deformity noted. Patient complains of tenderness at the  groin. Exam of the left hip shows no leg length discrepancy. Range of motion of the involved/uninvolved hip is 20 degrees internal rotation and 20 degrees external rotation/25 degrees internal rotation and 35 degrees external rotation, the hip joint is stable to testing. Strength of the lower extremity is limited by pain. The patient does not have ipsilateral knee pain, and is described as  none. Hip exam- Gait: antalgic; Strength: Hip Flexors 5/5; Hip Abductors 5/5; Hip Adduction 5/5. Knee exam :  Upon visual inspection there is not deformity noted. He does not have  pain on motion, there is not tenderness over the  global region. Range of motion of R. Knee is 0 to 120, and L. Knee is 0 to 120. there are not any masses, there is not ligamentous instability, there is not  deformity noted. 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 [place] has been progressive   ossification of this region since the prior study.  The hip joints are   symmetric and unremarkable. Radiographic findings reviewed with patient    Impression:  Encounter Diagnoses   Name Primary?  Closed displaced avulsion fracture of left ilium with routine healing, subsequent encounter Yes       Plan:  Natural history and expected course discussed. Questions answered. Educational materials distributed. He will need to follow up with Dr. Areli Urban. I am unable to treat this condition. I suggest requesting an appointment with Dr. Areli Urban.    At least 30 minutes was spent discussing the diagnosis and treatment options with the patient with at least 50% of the time was spent with decision making and counseling the patient.

## 2021-08-30 ENCOUNTER — OFFICE VISIT (OUTPATIENT)
Dept: ORTHOPEDIC SURGERY | Age: 56
End: 2021-08-30
Payer: COMMERCIAL

## 2021-08-30 VITALS — BODY MASS INDEX: 24.25 KG/M2 | WEIGHT: 160 LBS | HEIGHT: 68 IN | TEMPERATURE: 98.5 F

## 2021-08-30 DIAGNOSIS — M75.42 SHOULDER IMPINGEMENT, LEFT: ICD-10-CM

## 2021-08-30 DIAGNOSIS — S46.012A TRAUMATIC COMPLETE TEAR OF LEFT ROTATOR CUFF, INITIAL ENCOUNTER: Primary | ICD-10-CM

## 2021-08-30 DIAGNOSIS — S46.811D TRAUMATIC RUPTURE OF SUPRASPINATUS TENDON OF RIGHT SHOULDER, SUBSEQUENT ENCOUNTER: ICD-10-CM

## 2021-08-30 PROCEDURE — 99213 OFFICE O/P EST LOW 20 MIN: CPT | Performed by: NURSE PRACTITIONER

## 2021-08-30 NOTE — PROGRESS NOTES
Chief Complaint   Patient presents with    Shoulder Pain     follow up left shoulder C RTC repair 03/19/2021. Patient states he is doing well, does have some pain in his muscles. Suni Marie returns today for follow up of his bilateral  shoulder arthroscopy follow up. Left  Shoulder arthroscopy 3/19/21, right rtc repair 12/4/2020  he reports that the pain in the shoulder is better. he has been going to physical therapy. The patient is right dominant. The patient's pain level is a 1-2/10. The patient did respond to treatment. Past Medical History:   Diagnosis Date    ADHD     Kidney stones     Work related injury 08/2020    BILAT torn roatator cuffs ,chest contusion      Past Surgical History:   Procedure Laterality Date    COLONOSCOPY      FRACTURE SURGERY      FOOT   age 15  caught his foot in [address] Bilateral     arthroscopy    SHOULDER ARTHROSCOPY Right 12/04/2020    rotator cuff repair, subacromial decompression and debridement    SHOULDER ARTHROSCOPY Right 12/04/2020    RIGHT SHOULDER ARTHROSCOPY, ROTATOR CUFF REPAIR, SUBACROMIAL DECOMPRESSION (CPT 57021) performed by Sidra Poe DO at [address] [facility] ARTHROSCOPY Left 03/19/2021    LEFT SHOULDER ARTHROSCOPY SAD DEBRIDEMENT     SHOULDER ARTHROSCOPY Left 03/19/2021    LEFT SHOULDER ARTHROSCOPY SAD AND DEBRIDEMENT     SHOULDER ARTHROSCOPY Left 3/19/2021    LEFT SHOULDER ARTHROSCOPY, SUBACROMIAL DECOMPRESSION,  ROTATOR CUFF REPAIR AND DEBRIDEMENT (CPT 00217) (ARTHREX) [facility]       Current Outpatient Medications:     amoxicillin-clavulanate (AUGMENTIN) 875-125 MG per tablet, , Disp: , Rfl:     celecoxib (CELEBREX) 200 MG capsule, Take 1 capsule by mouth daily, Disp: 30 capsule, Rfl: 3    amphetamine-dextroamphetamine (ADDERALL, 10MG,) 10 MG tablet, Take 10 mg by mouth daily. , Disp: , Rfl:     oxyCODONE (OXYCONTIN) 30 MG T12A extended release tablet, Take 30 mg by mouth every 8 hours as needed. , Disp: , Rfl:   Allergies   Allergen Reactions    Iv Dye [Iodides] Anaphylaxis     Iodine dye     Social History     Socioeconomic History    Marital status:      Spouse name: Not on file    Number of children: Not on file    Years of education: Not on file    Highest education level: Not on file   Occupational History    Not on file   Tobacco Use    Smoking status: Former Smoker     Years: 10.00     Types: Cigarettes    Smokeless tobacco: Never Used   Vaping Use    Vaping Use: Never used   Substance and Sexual Activity    Alcohol use: Never    Drug use: Never    Sexual activity: Yes     Partners: Female   Other Topics Concern    Not on file   Social History Narrative    Not on file     Social Determinants of Health     Financial Resource Strain:     Difficulty of Paying Living Expenses:    Food Insecurity:     Worried About Running Out of Food in the Last Year:     [address] in the Last Year:    Transportation Needs:     Lack of Transportation (Medical):  Lack of Transportation (Non-Medical):    Physical Activity:     Days of Exercise per Week:     Minutes of Exercise per Session:    Stress:     Feeling of Stress :    Social Connections:     Frequency of Communication with Friends and Family:     Frequency of Social Gatherings with Friends and Family:     Attends Holiness Services:     Active Member of Clubs or Organizations:     Attends Club or Organization Meetings:     Marital Status:    Intimate Partner Violence:     Fear of Current or Ex-Partner:     Emotionally Abused:     Physically Abused:     Sexually Abused:      Family History   Problem Relation Age of Onset    No Known Problems Mother     Heart Failure Father        REVIEW OF SYSTEMS:     General/Constitution:  (-)weight loss, (-)fever, (-)chills, (-)weakness. Skin: (-) rash,(-) psoriasis,(-) eczema, (-)skin cancer.    Musculoskeletal: (-) fractures,  (-)

## 2021-09-01 ENCOUNTER — TELEPHONE (OUTPATIENT)
Dept: ORTHOPEDIC SURGERY | Age: 56
End: 2021-09-01

## 2021-09-01 DIAGNOSIS — S32.312D: Primary | ICD-10-CM

## 2021-09-09 ENCOUNTER — OFFICE VISIT (OUTPATIENT)
Dept: ORTHOPEDIC SURGERY | Age: 56
End: 2021-09-09
Payer: COMMERCIAL

## 2021-09-09 ENCOUNTER — HOSPITAL ENCOUNTER (OUTPATIENT)
Dept: GENERAL RADIOLOGY | Age: 56
Discharge: HOME | End: 2021-09-11
Payer: COMMERCIAL

## 2021-09-09 VITALS — TEMPERATURE: 97.8 F

## 2021-09-09 DIAGNOSIS — S32.312D: Primary | ICD-10-CM

## 2021-09-09 DIAGNOSIS — S32.312D: ICD-10-CM

## 2021-09-09 PROCEDURE — 99213 OFFICE O/P EST LOW 20 MIN: CPT | Performed by: ORTHOPAEDIC SURGERY

## 2021-09-09 PROCEDURE — 72190 X-RAY EXAM OF PELVIS: CPT

## 2021-09-09 PROCEDURE — 99212 OFFICE O/P EST SF 10 MIN: CPT | Performed by: ORTHOPAEDIC SURGERY

## 2021-09-09 RX ORDER — NAPROXEN 375 MG/1
375 TABLET, DELAYED RELEASE ORAL 2 TIMES DAILY WITH MEALS
Qty: 60 TABLET | Refills: 1 | Status: ON HOLD
Start: 2021-09-09 | End: 2021-11-17

## 2021-09-09 NOTE — PATIENT INSTRUCTIONS
You were ordered CT of pelvis with 3D recon by your Orthopedic Provider. If you do not hear from that department please contact: [phone]    Please make sure your follow up appointment in office is scheduled shortly after the above testing is complete. If your testing is completed significantly sooner than planned follow up contact office for appointment adjustment.

## 2021-09-09 NOTE — PROGRESS NOTES
Patient presents today WC Follow up: avulsion fracture of the left    Patient presents today stating he is constantly in pain. His is now having pain in his right hip and both knees. Patient states he would like to know why he is still in so much pain.

## 2021-09-13 ENCOUNTER — HOSPITAL ENCOUNTER (OUTPATIENT)
Dept: CT IMAGING | Age: 56
Discharge: HOME | End: 2021-09-15
Payer: COMMERCIAL

## 2021-09-13 DIAGNOSIS — S32.312D: ICD-10-CM

## 2021-09-13 PROCEDURE — 76376 3D RENDER W/INTRP POSTPROCES: CPT

## 2021-09-13 PROCEDURE — 72192 CT PELVIS W/O DYE: CPT

## 2021-09-30 ENCOUNTER — OFFICE VISIT (OUTPATIENT)
Dept: ORTHOPEDIC SURGERY | Age: 56
End: 2021-09-30
Payer: COMMERCIAL

## 2021-09-30 VITALS — TEMPERATURE: 97.8 F

## 2021-09-30 DIAGNOSIS — Z11.59 SCREENING FOR VIRAL DISEASE: Primary | ICD-10-CM

## 2021-09-30 DIAGNOSIS — S32.312S: ICD-10-CM

## 2021-09-30 PROCEDURE — 99214 OFFICE O/P EST MOD 30 MIN: CPT | Performed by: PHYSICIAN ASSISTANT

## 2021-09-30 PROCEDURE — 99213 OFFICE O/P EST LOW 20 MIN: CPT | Performed by: PHYSICIAN ASSISTANT

## 2021-09-30 NOTE — PROGRESS NOTES
Patient is here today to review CT scans. He states that his symptoms are unchanged since last visit.

## 2021-09-30 NOTE — PROGRESS NOTES
Orthopaedic H&P Note    Vy Castellon is a 54 y.o. male, his YOB: 1965 with the following history as recorded in [facility]:        Patient Active Problem List    Diagnosis Date Noted    Impingement syndrome of left shoulder 03/19/2021    Complete rotator cuff tear or rupture of right shoulder, not specified as traumatic 12/04/2020    Impingement syndrome of right shoulder 12/04/2020    Closed displaced avulsion fracture of left ilium ([address] 09/17/2020    Acute internal derangement of left knee 09/17/2020    Left shoulder strain 09/17/2020    Strain of left shoulder 09/17/2020    Closed fracture of one rib of left side 09/17/2020     Current Outpatient Medications   Medication Sig Dispense Refill    Naproxen (EC-NAPROSYN) 375 MG EC tablet Take 1 tablet by mouth 2 times daily (with meals) 60 tablet 1    amphetamine-dextroamphetamine (ADDERALL, 10MG,) 10 MG tablet Take 10 mg by mouth daily.  oxyCODONE (OXYCONTIN) 30 MG T12A extended release tablet Take 30 mg by mouth every 8 hours as needed.  amoxicillin-clavulanate (AUGMENTIN) 875-125 MG per tablet  (Patient not taking: Reported on 9/30/2021)      celecoxib (CELEBREX) 200 MG capsule Take 1 capsule by mouth daily (Patient not taking: Reported on 9/30/2021) 30 capsule 3     No current facility-administered medications for this visit. Allergies:  Iv dye [iodides]  Past Medical History:   Diagnosis Date    ADHD     Kidney stones     Work related injury 08/2020    BILAT torn roatator cuffs ,chest contusion      Past Surgical History:   Procedure Laterality Date    COLONOSCOPY      FRACTURE SURGERY      FOOT   age 15  caught his foot in lawnmower    KNEE SURGERY Bilateral     arthroscopy    SHOULDER ARTHROSCOPY Right 12/04/2020    rotator cuff repair, subacromial decompression and debridement    SHOULDER ARTHROSCOPY Right 12/04/2020    RIGHT SHOULDER ARTHROSCOPY, ROTATOR CUFF REPAIR, SUBACROMIAL DECOMPRESSION (CPT 27958) performed by Catrachito Barber DO at [address] ARTHROSCOPY Left 03/19/2021    LEFT SHOULDER ARTHROSCOPY SAD DEBRIDEMENT     SHOULDER ARTHROSCOPY Left 03/19/2021    LEFT SHOULDER ARTHROSCOPY SAD AND DEBRIDEMENT     SHOULDER ARTHROSCOPY Left 3/19/2021    LEFT SHOULDER ARTHROSCOPY, SUBACROMIAL DECOMPRESSION,  ROTATOR CUFF REPAIR AND DEBRIDEMENT (CPT 58544) ([address]) performed by Catrachito Barber DO at [facility]     Family History   Problem Relation Age of Onset    No Known Problems Mother     Heart Failure Father      Social History     Tobacco Use    Smoking status: Former Smoker     Years: 10.00     Types: Cigarettes    Smokeless tobacco: Never Used   Substance Use Topics    Alcohol use: Never                             Chief Complaint   Patient presents with    Follow Up After Procedure     Review CT scans       SUBJECTIVE: Mansi Pacheco is here for subsequent evaluation of pelvic fracture sustained on 8/2020 that was being followed non-operatively. States his pain about the anterior L pelvic region has remained constant, worse with L hip flexion and hurts to bicycle. This pain now interferes with his ADLs and he walks with a cane, WBAT L LE. This is [facility] injury and has been back to work full time. Here to f/u from recent CT. No new injuries or new complaints today. Review of Systems   Constitutional: Negative for fever, chills, diaphoresis, appetite change and fatigue. HENT: Negative for dental issues, hearing loss and tinnitus. Negative for congestion, sinus pressure, sneezing, sore throat. Negative for headache. Eyes: Negative for visual disturbance, blurred and double vision. Negative for pain, discharge, redness and itching  Respiratory: Negative for cough, shortness of breath and wheezing. Cardiovascular: Negative for chest pain, palpitations and leg swelling.  No dyspnea on exertion   Gastrointestinal:   Negative for nausea, vomiting, abdominal pain, diarrhea, tibialis pulses, cap refill brisk in toes, foot warm/perfused. Temp 97.8 °F (36.6 °C) (Oral)      CT 9/13/21:    Impression   1.  Old comminuted displaced multiple avulsed fused or united and unfused or   nonunited bony fragments of the more anterior aspect of the left iliac crest   extending into the anterior superior iliac spine, sites of attachment of   several muscles includes left sartorius, gluteal muscles: Minimus and medius,   and left tensor fascia scar.       2.  The fragments are lateral and peripheral to the left iliopsoas muscle.       3.  The 3D reconstructions demonstrate in better advantage the spatial   orientation and localization of these fragments which are now old.                 ASSESSMENT:     Diagnosis Orders   1. Screening for viral disease  COVID-19 Ambulatory   2. Closed displaced avulsion fracture of left ilium, sequela         Discussion:  Had lengthy discussion with patient regarding His diagnosis, typical prognosis, and expected outcomes. I reviewed the possible complications from the injury itself despite treatment choosen. I also discussed treatment options including nonoperative managements versus surgical management, along with risks and benefits of each. They have elected for surgical management at this time. Risk, benefits and treatment options discussed with Valdez Brenner.  he has verbalized understanding of options. The possibility of complications were also discussed to include but not limited to nerve damage, infection, problems with wound healing, vascular injury, chronic pain, stiffness, dysfunction, nonhealing of the bone, symptomatic hardware and/or its failure, need for subsequent surgery, dislocation, and blood clots as well as medical related problems and other problems not specifically discussed. Risk of anesthesia also discussed to include death.    Post-op care, work, activity and restrictions which included the use of pain medication and possibility of using blood thinner post op were also discussed with Johnnie Cedeno and he verbalized and agreed with the restrictions. PLAN:    L Ilium removal of heterotopic ossification vs revision of fracture fragment    Your surgery is scheduled for Wednesday, 10/20/21 at 8:30 AM  with Dr. Pantera Duncan DO at the main [address] [facility] on [facility] in [facility] . You will need to report to Preop area  that morning at 6:30 AM    You are having Outpatient surgery, but plan for 1-2 nights in the hospital for recovery if needed. Preadmission Testing (PAT) department at [facility] will contact you with all the details prior to surgery. Nothing to eat or drink after midnight the night before surgery. You may take a pain pill and any other medicine PAT instructs you to take with small sip of water if needed. Take pain medicine as instructed  If you are taking Aspirin/Lovenox, hold the day of surgery. Hold all NSAIDs (non-steroidal anti inflammatories like Advil, motrin, ibuprofen, etc) 7 days prior to surgery  COVID testing   Evaluated and discussed with Dr. Yuliya Dick     Call office with any question or concerns: [phone]    All surgical patients will be gradually tapered off narcotic pain medication post operatively, this office will not continue narcotics longer than 6 weeks from date of surgery. If narcotics are required longer than this time period you will be referred to pain management. Electronically signed by Wilbert Aguirre PA-C on 9/30/2021 at 3:56 PM  Note: This report was completed using computerTheCityGame voiced recognition software. Every effort has been made to ensure accuracy; however, inadvertent computerized transcription errors may be present.

## 2021-09-30 NOTE — PATIENT INSTRUCTIONS
 Your surgery is scheduled for Wednesday, 10/20/21 at 8:30 AM  with Dr. Dm Zhang DO at the [facility] on [address] [address] in [place] . You will need to report to Preop area  that morning at 6:30 AM     You are having Outpatient surgery, but plan for 1-2 nights in the hospital for recovery if needed.  Preadmission Testing (PAT) department at [facility] will contact you with all the details prior to surgery.  Nothing to eat or drink after midnight the night before surgery. You may take a pain pill and any other medicine PAT instructs you to take with small sip of water if needed.  Take pain medicine as instructed   If you are taking Aspirin/Lovenox, hold the day of surgery. Hold all NSAIDs (non-steroidal anti inflammatories like Advil, motrin, ibuprofen, etc) 7 days prior to surgery    Call office with any question or concerns: [phone]    All surgical patients will be gradually tapered off narcotic pain medication post operatively, this office will not continue narcotics longer than 6 weeks from date of surgery. If narcotics are required longer than this time period you will be referred to pain management. OUTPATIENT ORTHOPEDIC SURGERY  PRE-OP COVID TESTING    If you are fully vaccinated (at least 2 weeks from second dose of vaccine): No pre-operative COVID19 test is needed if you show your COVID19 vaccine card or a photo of the vaccine card at either your PAT (pre-admission testing) appointment or the day of your surgery. If you fail to show evidence of your COVID19 vaccination, a rapid test will be administered the day of your surgery.      Patient who are not fully vaccinated:     [address] testing is still required - please see below       We need to have COVID-19 Testing done 4 days (not including Sunday) prior to your scheduled surgery   After your testing is completed you will need to Self Quarantine until date of surgery     If your surgery is scheduled for:  Monday- Testing needs to be done Wednesday Tuesday- Testing needs to be done Thursday Wednesday- Testing needs to be done Friday Thursday- Testing needs to be done Friday Friday- Testing needs to be done Monday    Locations and hours are listed below: These sites will not test anyone without a physician order. The order can be in [facility] or can be a paper order.     [facility] ([address])   [address] [facility] [address] TESTING: Monday - Friday 7AM -10 AM  WALK IN TESTING: Monday - Friday 8AM-4PM, Saturday 8AM-11AM    [place]- Monday - Friday 6AM-2:30PM  [id number] [facility] Testing Monday - Friday 6AM-10 [facility]  Site will be [facility] [address] Pat Aguilera [employer] You will follow white signs that say SURGERY TESTING   Please bring a valid photo ID with you   Please bring order for COVID 19 test with you   Pre-Admission Testing will also contact you to review COVID 19 testing and protocol

## 2021-10-08 ENCOUNTER — TELEPHONE (OUTPATIENT)
Dept: ORTHOPEDIC SURGERY | Age: 56
End: 2021-10-08

## 2021-10-29 ENCOUNTER — TELEPHONE (OUTPATIENT)
Dept: ORTHOPEDIC SURGERY | Age: 56
End: 2021-10-29

## 2021-10-29 NOTE — TELEPHONE ENCOUNTER
Patient is scheduled for surgery on 11- @ [facility] for Left Ilium surgery with Dr. Asha Mayer. Patient demographics, radiology reports, clinical documentation, C-9 Form requesting surgery, po check schedule was faxed to [facility] today. Fax confirmation page received.     [facility]   Phone #[phone]  Fax [phone] # [phone]  DOI: [id number]

## 2021-11-05 ENCOUNTER — PREP FOR PROCEDURE (OUTPATIENT)
Dept: ORTHOPEDIC SURGERY | Age: 56
End: 2021-11-05

## 2021-11-05 ENCOUNTER — HOSPITAL ENCOUNTER (OUTPATIENT)
Age: 56
Discharge: HOME | End: 2021-11-05
Payer: COMMERCIAL

## 2021-11-05 LAB
EKG ATRIAL RATE: 68 BPM
EKG P AXIS: 75 DEGREES
EKG P-R INTERVAL: 166 MS
EKG Q-T INTERVAL: 408 MS
EKG QRS DURATION: 100 MS
EKG QTC CALCULATION (BAZETT): 433 MS
EKG R AXIS: -39 DEGREES
EKG T AXIS: 52 DEGREES
EKG VENTRICULAR RATE: 68 BPM

## 2021-11-05 PROCEDURE — 93005 ELECTROCARDIOGRAM TRACING: CPT | Performed by: ANESTHESIOLOGY

## 2021-11-05 RX ORDER — SODIUM CHLORIDE 0.9 % (FLUSH) 0.9 %
10 SYRINGE (ML) INJECTION EVERY 12 HOURS SCHEDULED
Status: CANCELLED | OUTPATIENT
Start: 2021-11-05

## 2021-11-05 RX ORDER — SODIUM CHLORIDE 9 MG/ML
25 INJECTION, SOLUTION INTRAVENOUS PRN
Status: CANCELLED | OUTPATIENT
Start: 2021-11-05

## 2021-11-05 RX ORDER — SODIUM CHLORIDE 0.9 % (FLUSH) 0.9 %
10 SYRINGE (ML) INJECTION PRN
Status: CANCELLED | OUTPATIENT
Start: 2021-11-05

## 2021-11-05 NOTE — H&P
Orthopaedic H&P Note     Paramjit Butler is a 54 y.o. male, his YOB: 1965 with the following history as recorded in [facility]:                Patient Active Problem List     Diagnosis Date Noted    Impingement syndrome of left shoulder 03/19/2021    Complete rotator cuff tear or rupture of right shoulder, not specified as traumatic 12/04/2020    Impingement syndrome of right shoulder 12/04/2020    Closed displaced avulsion fracture of left ilium ([address] 09/17/2020    Acute internal derangement of left knee 09/17/2020    Left shoulder strain 09/17/2020    Strain of left shoulder 09/17/2020    Closed fracture of one rib of left side 09/17/2020      Current Facility-Administered Medications          Current Outpatient Medications   Medication Sig Dispense Refill    Naproxen (EC-NAPROSYN) 375 MG EC tablet Take 1 tablet by mouth 2 times daily (with meals) 60 tablet 1    amphetamine-dextroamphetamine (ADDERALL, 10MG,) 10 MG tablet Take 10 mg by mouth daily.        oxyCODONE (OXYCONTIN) 30 MG T12A extended release tablet Take 30 mg by mouth every 8 hours as needed.         amoxicillin-clavulanate (AUGMENTIN) 875-125 MG per tablet  (Patient not taking: Reported on 9/30/2021)        celecoxib (CELEBREX) 200 MG capsule Take 1 capsule by mouth daily (Patient not taking: Reported on 9/30/2021) 30 capsule 3      No current facility-administered medications for this visit.         Allergies:  Iv dye [iodides]  Past Medical History        Past Medical History:   Diagnosis Date    ADHD      Kidney stones      Work related injury 08/2020     BILAT torn roatator cuffs ,chest contusion          Past Surgical History         Past Surgical History:   Procedure Laterality Date    COLONOSCOPY        FRACTURE SURGERY         FOOT   age 15  caught his foot in lawnmower    KNEE SURGERY Bilateral       arthroscopy    SHOULDER ARTHROSCOPY Right 12/04/2020     rotator cuff repair, subacromial decompression and debridement    SHOULDER ARTHROSCOPY Right 12/04/2020     RIGHT SHOULDER ARTHROSCOPY, ROTATOR CUFF REPAIR, SUBACROMIAL DECOMPRESSION (CPT 64075) performed by Wong Tomas DO at [address] ARTHROSCOPY Left 03/19/2021     LEFT SHOULDER ARTHROSCOPY SAD DEBRIDEMENT     SHOULDER ARTHROSCOPY Left 03/19/2021     LEFT SHOULDER ARTHROSCOPY SAD AND DEBRIDEMENT     SHOULDER ARTHROSCOPY Left 3/19/2021     LEFT SHOULDER ARTHROSCOPY, SUBACROMIAL DECOMPRESSION,  ROTATOR CUFF REPAIR AND DEBRIDEMENT (CPT 57441) ([address]) performed by Wong Tomas DO at [facility]         Family History   Family History   Problem Relation Age of Onset    No Known Problems Mother      Heart Failure Father           Social History            Tobacco Use    Smoking status: Former Smoker       Years: 10.00       Types: Cigarettes    Smokeless tobacco: Never Used   Substance Use Topics    Alcohol use: Never                                    Chief Complaint   Patient presents with    Follow Up After Procedure       Review CT scans         SUBJECTIVE: Fiorella Pacheco is here for subsequent evaluation of pelvic fracture sustained on 8/2020 that was being followed non-operatively. States his pain about the anterior L pelvic region has remained constant, worse with L hip flexion and hurts to bicycle. This pain now interferes with his ADLs and he walks with a cane, WBAT L LE. This is [facility] injury and has been back to work full time. Here to f/u from recent CT. No new injuries or new complaints today.      Review of Systems   Constitutional: Negative for fever, chills, diaphoresis, appetite change and fatigue. HENT: Negative for dental issues, hearing loss and tinnitus. Negative for congestion, sinus pressure, sneezing, sore throat. Negative for headache. Eyes: Negative for visual disturbance, blurred and double vision.  Negative for pain, discharge, redness and itching  Respiratory: Negative for cough, shortness of breath and wheezing. Cardiovascular: Negative for chest pain, palpitations and leg swelling. No dyspnea on exertion   Gastrointestinal:   Negative for nausea, vomiting, abdominal pain, diarrhea, constipation  or black or bloody. Hematologic\Lymphatic:  negative for bleeding, petechiae,   Genitourinary: Negative for hematuria and difficulty urinating. Musculoskeletal: Negative for neck pain and stiffness. Mild for back pain, negative joint swelling and gait problem. Skin: Negative for pallor, rash and wound. Neurological: Negative for dizziness, tremors, seizures, weakness, light-headedness, no TIA or stroke symptoms. No numbness and headaches.    Psychiatric/Behavioral: Negative.      Physical Examination:   General appearance: alert, well appearing, and in no distress,  normal appearing weight  Mental status: alert, oriented to person, place, and time, normal mood, behavior, speech, dress, motor activity, and thought processes  Abdomen: soft, nondistended   Resp:   resp easy and unlabored, no audible wheezes note  Cardiac: distal pulses palpable, skin well perfused  Neurological: alert, oriented X3, normal speech, no focal findings or movement disorder noted, motor and sensory grossly normal bilaterally, normal muscle tone, no tremors, strength 5/5, normal gait and station  HEENT: normochephalic atraumatic, external ears and eyes normal, sclera normal, neck supple  Extremities:   peripheral pulses normal, no edema, redness or tenderness in the calves   Skin: normal coloration, no rashes or open wounds, no suspicious skin lesions noted  Psych: Affect euthymic   Musculoskeletal:    Extremity:  Left Lower Extremity  Skin clean dry and intact, without signs of infection  5/5 L hip  Tenderness about the anterior L hip to passive flexion, IR, and ER  No edema noted   Compartments supple throughout thigh and leg  Calf supple and nontender  Demonstrates active knee flexion/extension, ankle plantar/dorsiflexion/great toe extension. States sensation intact to touch in sural/deep peroneal/superficial peroneal/saphenous/posterior tibial nerve distributions to foot/ankle. Palpable dorsalis pedis and posterior tibialis pulses, cap refill brisk in toes, foot warm/perfused.            Temp 97.8 °F (36.6 °C) (Oral)      CT 9/13/21:     Impression   1.  Old comminuted displaced multiple avulsed fused or united and unfused or   nonunited bony fragments of the more anterior aspect of the left iliac crest   extending into the anterior superior iliac spine, sites of attachment of   several muscles includes left sartorius, gluteal muscles: Minimus and medius,   and left tensor fascia scar.       2.  The fragments are lateral and peripheral to the left iliopsoas muscle.       3.  The 3D reconstructions demonstrate in better advantage the spatial   orientation and localization of these fragments which are now old.                    ASSESSMENT:       Diagnosis Orders   1. Screening for viral disease  COVID-19 Ambulatory   2. Closed displaced avulsion fracture of left ilium, sequela            Discussion:  Had lengthy discussion with patient regarding His diagnosis, typical prognosis, and expected outcomes. I reviewed the possible complications from the injury itself despite treatment choosen. I also discussed treatment options including nonoperative managements versus surgical management, along with risks and benefits of each. They have elected for surgical management at this time.          Risk, benefits and treatment options discussed with Ralph Pacheco.  he has verbalized understanding of options.  The possibility of complications were also discussed to include but not limited to nerve damage, infection, problems with wound healing, vascular injury, chronic pain, stiffness, dysfunction, nonhealing of the bone, symptomatic hardware and/or its failure, need for subsequent surgery, dislocation, and blood clots as well as medical related problems and other problems not specifically discussed. Risk of anesthesia also discussed to include death. Post-op care, work, activity and restrictions which included the use of pain medication and possibility of using blood thinner post op were also discussed with Cl Primes and he verbalized and agreed with the restrictions.      PLAN:     L Ilium removal of heterotopic ossification vs revision of fracture fragment     · Your surgery is scheduled for Wednesday, 10/20/21 at 8:30 AM  with Dr. Irvin Dockery DO at the [facility] in [facility] . You will need to report to Preop area  that morning at 6:30 AM    · You are having Outpatient surgery, but plan for 1-2 nights in the hospital for recovery if needed. · Preadmission Testing (PAT) department at [facility] will contact you with all the details prior to surgery. · Nothing to eat or drink after midnight the night before surgery. You may take a pain pill and any other medicine PAT instructs you to take with small sip of water if needed. · Take pain medicine as instructed  · If you are taking Aspirin/Lovenox, hold the day of surgery. Hold all NSAIDs (non-steroidal anti inflammatories like Advil, motrin, ibuprofen, etc) 7 days prior to surgery  · COVID testing   · Evaluated and discussed with Dr. Mima Schaeffer      Call office with any question or concerns: [phone]     All surgical patients will be gradually tapered off narcotic pain medication post operatively, this office will not continue narcotics longer than 6 weeks from date of surgery. If narcotics are required longer than this time period you will be referred to pain management.

## 2021-11-05 NOTE — H&P (VIEW-ONLY)
Orthopaedic H&P Note     Sierra Kitchen is a 54 y.o. male, his YOB: 1965 with the following history as recorded in [facility]:                Patient Active Problem List     Diagnosis Date Noted    Impingement syndrome of left shoulder 03/19/2021    Complete rotator cuff tear or rupture of right shoulder, not specified as traumatic 12/04/2020    Impingement syndrome of right shoulder 12/04/2020    Closed displaced avulsion fracture of left ilium ([address] 09/17/2020    Acute internal derangement of left knee 09/17/2020    Left shoulder strain 09/17/2020    Strain of left shoulder 09/17/2020    Closed fracture of one rib of left side 09/17/2020      Current Facility-Administered Medications          Current Outpatient Medications   Medication Sig Dispense Refill    Naproxen (EC-NAPROSYN) 375 MG EC tablet Take 1 tablet by mouth 2 times daily (with meals) 60 tablet 1    amphetamine-dextroamphetamine (ADDERALL, 10MG,) 10 MG tablet Take 10 mg by mouth daily.        oxyCODONE (OXYCONTIN) 30 MG T12A extended release tablet Take 30 mg by mouth every 8 hours as needed.         amoxicillin-clavulanate (AUGMENTIN) 875-125 MG per tablet  (Patient not taking: Reported on 9/30/2021)        celecoxib (CELEBREX) 200 MG capsule Take 1 capsule by mouth daily (Patient not taking: Reported on 9/30/2021) 30 capsule 3      No current facility-administered medications for this visit.         Allergies:  Iv dye [iodides]  Past Medical History        Past Medical History:   Diagnosis Date    ADHD      Kidney stones      Work related injury 08/2020     BILAT torn roatator cuffs ,chest contusion          Past Surgical History         Past Surgical History:   Procedure Laterality Date    COLONOSCOPY        FRACTURE SURGERY         FOOT   age 15  caught his foot in lawnmower    KNEE SURGERY Bilateral       arthroscopy    SHOULDER ARTHROSCOPY Right 12/04/2020     rotator cuff repair, subacromial decompression and debridement    SHOULDER ARTHROSCOPY Right 12/04/2020     RIGHT SHOULDER ARTHROSCOPY, ROTATOR CUFF REPAIR, SUBACROMIAL DECOMPRESSION (CPT 59416) performed by Tae Sagastume DO at [address] ARTHROSCOPY Left 03/19/2021     LEFT SHOULDER ARTHROSCOPY SAD DEBRIDEMENT     SHOULDER ARTHROSCOPY Left 03/19/2021     LEFT SHOULDER ARTHROSCOPY SAD AND DEBRIDEMENT     SHOULDER ARTHROSCOPY Left 3/19/2021     LEFT SHOULDER ARTHROSCOPY, SUBACROMIAL DECOMPRESSION,  ROTATOR CUFF REPAIR AND DEBRIDEMENT (CPT 16369) ([address]) performed by Tae Sagastume DO at [facility]         Family History   Family History   Problem Relation Age of Onset    No Known Problems Mother      Heart Failure Father           Social History            Tobacco Use    Smoking status: Former Smoker       Years: 10.00       Types: Cigarettes    Smokeless tobacco: Never Used   Substance Use Topics    Alcohol use: Never                                    Chief Complaint   Patient presents with    Follow Up After Procedure       Review CT scans         SUBJECTIVE: Neil Albarado Tara is here for subsequent evaluation of pelvic fracture sustained on 8/2020 that was being followed non-operatively. States his pain about the anterior L pelvic region has remained constant, worse with L hip flexion and hurts to bicycle. This pain now interferes with his ADLs and he walks with a cane, WBAT L LE. This is [facility] injury and has been back to work full time. Here to f/u from recent CT. No new injuries or new complaints today.      Review of Systems   Constitutional: Negative for fever, chills, diaphoresis, appetite change and fatigue. HENT: Negative for dental issues, hearing loss and tinnitus. Negative for congestion, sinus pressure, sneezing, sore throat. Negative for headache. Eyes: Negative for visual disturbance, blurred and double vision.  Negative for pain, discharge, redness and itching  Respiratory: Negative for cough, shortness of breath and wheezing. Cardiovascular: Negative for chest pain, palpitations and leg swelling. No dyspnea on exertion   Gastrointestinal:   Negative for nausea, vomiting, abdominal pain, diarrhea, constipation  or black or bloody. Hematologic\Lymphatic:  negative for bleeding, petechiae,   Genitourinary: Negative for hematuria and difficulty urinating. Musculoskeletal: Negative for neck pain and stiffness. Mild for back pain, negative joint swelling and gait problem. Skin: Negative for pallor, rash and wound. Neurological: Negative for dizziness, tremors, seizures, weakness, light-headedness, no TIA or stroke symptoms. No numbness and headaches.    Psychiatric/Behavioral: Negative.      Physical Examination:   General appearance: alert, well appearing, and in no distress,  normal appearing weight  Mental status: alert, oriented to person, place, and time, normal mood, behavior, speech, dress, motor activity, and thought processes  Abdomen: soft, nondistended   Resp:   resp easy and unlabored, no audible wheezes note  Cardiac: distal pulses palpable, skin well perfused  Neurological: alert, oriented X3, normal speech, no focal findings or movement disorder noted, motor and sensory grossly normal bilaterally, normal muscle tone, no tremors, strength 5/5, normal gait and station  HEENT: normochephalic atraumatic, external ears and eyes normal, sclera normal, neck supple  Extremities:   peripheral pulses normal, no edema, redness or tenderness in the calves   Skin: normal coloration, no rashes or open wounds, no suspicious skin lesions noted  Psych: Affect euthymic   Musculoskeletal:    Extremity:  Left Lower Extremity  Skin clean dry and intact, without signs of infection  5/5 L hip  Tenderness about the anterior L hip to passive flexion, IR, and ER  No edema noted   Compartments supple throughout thigh and leg  Calf supple and nontender  Demonstrates active knee flexion/extension, ankle plantar/dorsiflexion/great toe extension. States sensation intact to touch in sural/deep peroneal/superficial peroneal/saphenous/posterior tibial nerve distributions to foot/ankle. Palpable dorsalis pedis and posterior tibialis pulses, cap refill brisk in toes, foot warm/perfused.            Temp 97.8 °F (36.6 °C) (Oral)      CT 9/13/21:     Impression   1.  Old comminuted displaced multiple avulsed fused or united and unfused or   nonunited bony fragments of the more anterior aspect of the left iliac crest   extending into the anterior superior iliac spine, sites of attachment of   several muscles includes left sartorius, gluteal muscles: Minimus and medius,   and left tensor fascia scar.       2.  The fragments are lateral and peripheral to the left iliopsoas muscle.       3.  The 3D reconstructions demonstrate in better advantage the spatial   orientation and localization of these fragments which are now old.                    ASSESSMENT:       Diagnosis Orders   1. Screening for viral disease  COVID-19 Ambulatory   2. Closed displaced avulsion fracture of left ilium, sequela            Discussion:  Had lengthy discussion with patient regarding His diagnosis, typical prognosis, and expected outcomes. I reviewed the possible complications from the injury itself despite treatment choosen. I also discussed treatment options including nonoperative managements versus surgical management, along with risks and benefits of each. They have elected for surgical management at this time.          Risk, benefits and treatment options discussed with Ralph Pacheco.  he has verbalized understanding of options.  The possibility of complications were also discussed to include but not limited to nerve damage, infection, problems with wound healing, vascular injury, chronic pain, stiffness, dysfunction, nonhealing of the bone, symptomatic hardware and/or its failure, need for subsequent surgery, dislocation, and blood clots as well as medical related problems and other problems not specifically discussed. Risk of anesthesia also discussed to include death. Post-op care, work, activity and restrictions which included the use of pain medication and possibility of using blood thinner post op were also discussed with Marylou Curtis and he verbalized and agreed with the restrictions.      PLAN:     L Ilium removal of heterotopic ossification vs revision of fracture fragment     · Your surgery is scheduled for Wednesday, 10/20/21 at 8:30 AM  with Dr. Olinda Alonzo DO at the [facility] on [facility] in [facility] . You will need to report to Preop area  that morning at 6:30 AM    · You are having Outpatient surgery, but plan for 1-2 nights in the hospital for recovery if needed. · Preadmission Testing (PAT) department at [facility] will contact you with all the details prior to surgery. · Nothing to eat or drink after midnight the night before surgery. You may take a pain pill and any other medicine PAT instructs you to take with small sip of water if needed. · Take pain medicine as instructed  · If you are taking Aspirin/Lovenox, hold the day of surgery. Hold all NSAIDs (non-steroidal anti inflammatories like Advil, motrin, ibuprofen, etc) 7 days prior to surgery  · COVID testing   · Evaluated and discussed with Dr. Jason Matthews      Call office with any question or concerns: [phone]     All surgical patients will be gradually tapered off narcotic pain medication post operatively, this office will not continue narcotics longer than 6 weeks from date of surgery. If narcotics are required longer than this time period you will be referred to pain management.

## 2021-11-11 RX ORDER — OXYCODONE HYDROCHLORIDE 20 MG/1
20 TABLET ORAL EVERY 8 HOURS PRN
COMMUNITY
Start: 2021-10-28

## 2021-11-11 NOTE — PROGRESS NOTES
Nell 36 PRE-ADMISSION TESTING GENERAL INSTRUCTIONS- [facility]-phone number:[phone]    GENERAL INSTRUCTIONS  [x] Antibacterial Soap shower Night before and/or AM of Surgery  [] Evaristo wipe instruction sheet and wipes given. [x] Nothing by mouth after midnight, including gum, candy, mints, or water. [x] You may brush your teeth, gargle, but do NOT swallow water. []Hibiclens shower  the night before and the morning of surgery. Do not use             Hibiclens on your face or head. [x]No smoking, chewing tobacco, illegal drugs, or alcohol within 24 hours of your surgery. [x] Jewelry, valuables or body piercing's should not be brought to the hospital. All body and/or tongue piercing's must be removed prior to arriving to hospital.  ALL hair pins must be removed. [x] Do not wear makeup, lotions, powders, deodorant. Nail polish as directed by the nurse. [x] Arrange transportation with a responsible adult  to and from the hospital. If you do not have a responsible adult  to transport you, you will need to make arrangements with a medical transportation company (i.e. [employer]. A Uber/taxi/bus is not appropriate unless you are accompanied by a responsible adult ). Arrange for someone to be with you for the remainder of the day and for 24 hours after your procedure due to having had anesthesia. Who will be your  for transportation?______SUSAN____________   Who will be staying with you for 24 hrs after your procedure?_____SUSAN_____________  [] Bring insurance card and photo ID.  [] Transfusion Bracelet: Please bring with you to hospital, day of surgery  [] Bring urine specimen day of surgery. Any small container is acceptable. [] Use inhalers the morning of surgery and bring with you to hospital.  [] Bring copy of living will or healthcare power of  papers to be placed in your electronic record.   [] CPAP/BI-PAP: Please bring your machine if you are to spend the night in the hospital.     PARKING INSTRUCTIONS:   [x] Arrival Time:__0645___________  · [] Parking lot '\"I\"  is located on [facility] (the corner of [facility] and [facility]). To enter, press the button and the gate will lift. A free token will be provided to exit the lot. One car per patient is allowed to park in this lot. All other cars are to park on [address] [place] [address] either in the parking garage or the handicap lot. [] To reach the [facility] lobby from [address] [place] [address], upon entering the hospital, take elevator B to the 3rd floor. EDUCATION INSTRUCTIONS:      [] Knee or hip replacement booklet & exercise pamphlets given. [] Mannie 77 placed in chart. [] Pre-admission Testing educational folder given  [] Incentive Spirometry,coughing & deep breathing exercises reviewed. []Medication information sheet(s)   [x]Fluoroscopy-Xray used in surgery reviewed with patient. Educational pamphlet placed in chart. [x]Pain: Post-op pain is normal and to be expected. You will be asked to rate your pain from 0-10(a zero is not acceptable-education is needed). Your post-op pain goal is:5  [] Ask your nurse for your pain medication. [] Joint camp offered. [] Joint replacement booklets given. [] Other:___________________________    MEDICATION INSTRUCTIONS:   [x]Bring a complete list of your medications, please write the last time you took the medicine, give this list to the nurse. [x] Take the following medications the morning of surgery with 1-2 ounces of water: SEE MED LIST  [] Stop herbal supplements and vitamins 5 days before your surgery. [] DO NOT take any diabetic medicine the morning of surgery. Follow instructions for insulin the day before surgery. [] If you are diabetic and your blood sugar is low or you feel symptomatic, you may drink 1-2 ounces of apple juice or take a glucose tablet.   The morning of your procedure, you may call the pre-op area if you have concerns about your blood sugar [phone]. [] Use your inhalers the morning of surgery. Bring your emergency inhaler with you day of surgery. [] Follow physician instructions regarding any blood thinners you may be taking. WHAT TO EXPECT:  [x] The day of surgery you will be greeted and checked in by the [employer].  In addition, you will be registered in the [facility] by a Patient Access Representative. Please bring your photo ID and insurance card. A nurse will greet you in accordance to the time you are needed in the pre-op area to prepare you for surgery. Please do not be discouraged if you are not greeted in the order you arrive as there are many variables that are involved in patient preparation. Your patience is greatly appreciated as you wait for your nurse. Please bring in items such as: books, magazines, newspapers, electronics, or any other items  to occupy your time in the waiting area. [x]  Delays may occur with surgery and staff will make a sincere effort to keep you informed of delays. If any delays occur with your procedure, we apologize ahead of time for your inconvenience as we recognize the value of your time.

## 2021-11-12 ENCOUNTER — HOSPITAL ENCOUNTER (OUTPATIENT)
Age: 56
Discharge: HOME | End: 2021-11-14
Payer: COMMERCIAL

## 2021-11-12 PROCEDURE — U0005 INFEC AGEN DETEC AMPLI PROBE: HCPCS

## 2021-11-12 PROCEDURE — U0003 INFECTIOUS AGENT DETECTION BY NUCLEIC ACID (DNA OR RNA); SEVERE ACUTE RESPIRATORY SYNDROME CORONAVIRUS 2 (SARS-COV-2) (CORONAVIRUS DISEASE [COVID-19]), AMPLIFIED PROBE TECHNIQUE, MAKING USE OF HIGH THROUGHPUT TECHNOLOGIES AS DESCRIBED BY CMS-2020-01-R: HCPCS

## 2021-11-13 DIAGNOSIS — U07.1 COVID-19: ICD-10-CM

## 2021-11-14 LAB
SARS-COV-2: NOT DETECTED
SOURCE: NORMAL

## 2021-11-16 ENCOUNTER — ANESTHESIA EVENT (OUTPATIENT)
Dept: OPERATING ROOM | Age: 56
End: 2021-11-16
Payer: COMMERCIAL

## 2021-11-17 ENCOUNTER — ANESTHESIA (OUTPATIENT)
Dept: OPERATING ROOM | Age: 56
End: 2021-11-17
Payer: COMMERCIAL

## 2021-11-17 ENCOUNTER — APPOINTMENT (OUTPATIENT)
Dept: GENERAL RADIOLOGY | Age: 56
End: 2021-11-17
Attending: ORTHOPAEDIC SURGERY
Payer: COMMERCIAL

## 2021-11-17 ENCOUNTER — HOSPITAL ENCOUNTER (OUTPATIENT)
Age: 56
Setting detail: OUTPATIENT SURGERY
Discharge: HOME | End: 2021-11-17
Attending: ORTHOPAEDIC SURGERY | Admitting: ORTHOPAEDIC SURGERY
Payer: COMMERCIAL

## 2021-11-17 VITALS
TEMPERATURE: 96.3 F | SYSTOLIC BLOOD PRESSURE: 130 MMHG | DIASTOLIC BLOOD PRESSURE: 79 MMHG | OXYGEN SATURATION: 99 % | RESPIRATION RATE: 1 BRPM

## 2021-11-17 VITALS
SYSTOLIC BLOOD PRESSURE: 122 MMHG | HEART RATE: 93 BPM | TEMPERATURE: 97.5 F | DIASTOLIC BLOOD PRESSURE: 86 MMHG | HEIGHT: 68 IN | BODY MASS INDEX: 24.25 KG/M2 | OXYGEN SATURATION: 97 % | WEIGHT: 160 LBS | RESPIRATION RATE: 20 BRPM

## 2021-11-17 DIAGNOSIS — R10.2 PELVIC PAIN: ICD-10-CM

## 2021-11-17 DIAGNOSIS — Z98.890 HISTORY OF SURGERY: ICD-10-CM

## 2021-11-17 DIAGNOSIS — U07.1 COVID-19: ICD-10-CM

## 2021-11-17 DIAGNOSIS — Z01.810 PRE-OPERATIVE CARDIOVASCULAR EXAMINATION: Primary | ICD-10-CM

## 2021-11-17 LAB
ALBUMIN SERPL-MCNC: 4.2 G/DL (ref 3.5–5.2)
ALP BLD-CCNC: 59 U/L (ref 40–129)
ALT SERPL-CCNC: 40 U/L (ref 0–40)
ANION GAP SERPL CALCULATED.3IONS-SCNC: 9 MMOL/L (ref 7–16)
AST SERPL-CCNC: 26 U/L (ref 0–39)
BASOPHILS ABSOLUTE: 0.04 E9/L (ref 0–0.2)
BASOPHILS RELATIVE PERCENT: 0.9 % (ref 0–2)
BILIRUB SERPL-MCNC: 0.8 MG/DL (ref 0–1.2)
BUN BLDV-MCNC: 20 MG/DL (ref 6–20)
CALCIUM SERPL-MCNC: 9.1 MG/DL (ref 8.6–10.2)
CHLORIDE BLD-SCNC: 102 MMOL/L (ref 98–107)
CO2: 25 MMOL/L (ref 22–29)
CREAT SERPL-MCNC: 0.8 MG/DL (ref 0.7–1.2)
EOSINOPHILS ABSOLUTE: 0.04 E9/L (ref 0.05–0.5)
EOSINOPHILS RELATIVE PERCENT: 0.9 % (ref 0–6)
GFR AFRICAN AMERICAN: >60
GFR NON-AFRICAN AMERICAN: >60 ML/MIN/1.73
GLUCOSE BLD-MCNC: 88 MG/DL (ref 74–99)
HCT VFR BLD CALC: 45 % (ref 37–54)
HEMOGLOBIN: 15.8 G/DL (ref 12.5–16.5)
IMMATURE GRANULOCYTES #: 0.01 E9/L
IMMATURE GRANULOCYTES %: 0.2 % (ref 0–5)
LYMPHOCYTES ABSOLUTE: 2.14 E9/L (ref 1.5–4)
LYMPHOCYTES RELATIVE PERCENT: 45.5 % (ref 20–42)
MCH RBC QN AUTO: 29.6 PG (ref 26–35)
MCHC RBC AUTO-ENTMCNC: 35.1 % (ref 32–34.5)
MCV RBC AUTO: 84.4 FL (ref 80–99.9)
MONOCYTES ABSOLUTE: 0.45 E9/L (ref 0.1–0.95)
MONOCYTES RELATIVE PERCENT: 9.6 % (ref 2–12)
NEUTROPHILS ABSOLUTE: 2.02 E9/L (ref 1.8–7.3)
NEUTROPHILS RELATIVE PERCENT: 42.9 % (ref 43–80)
PDW BLD-RTO: 12.5 FL (ref 11.5–15)
PLATELET # BLD: 208 E9/L (ref 130–450)
PMV BLD AUTO: 8.3 FL (ref 7–12)
POTASSIUM REFLEX MAGNESIUM: 4.1 MMOL/L (ref 3.5–5)
RBC # BLD: 5.33 E12/L (ref 3.8–5.8)
SODIUM BLD-SCNC: 136 MMOL/L (ref 132–146)
TOTAL PROTEIN: 6.7 G/DL (ref 6.4–8.3)
WBC # BLD: 4.7 E9/L (ref 4.5–11.5)

## 2021-11-17 PROCEDURE — 3700000001 HC ADD 15 MINUTES (ANESTHESIA): Performed by: ORTHOPAEDIC SURGERY

## 2021-11-17 PROCEDURE — 3700000000 HC ANESTHESIA ATTENDED CARE: Performed by: ORTHOPAEDIC SURGERY

## 2021-11-17 PROCEDURE — 2709999900 HC NON-CHARGEABLE SUPPLY: Performed by: ORTHOPAEDIC SURGERY

## 2021-11-17 PROCEDURE — 7100000011 HC PHASE II RECOVERY - ADDTL 15 MIN: Performed by: ORTHOPAEDIC SURGERY

## 2021-11-17 PROCEDURE — 3209999900 FLUORO FOR SURGICAL PROCEDURES

## 2021-11-17 PROCEDURE — 36415 COLL VENOUS BLD VENIPUNCTURE: CPT

## 2021-11-17 PROCEDURE — 2500000003 HC RX 250 WO HCPCS: Performed by: ORTHOPAEDIC SURGERY

## 2021-11-17 PROCEDURE — 3600000015 HC SURGERY LEVEL 5 ADDTL 15MIN: Performed by: ORTHOPAEDIC SURGERY

## 2021-11-17 PROCEDURE — 88304 TISSUE EXAM BY PATHOLOGIST: CPT

## 2021-11-17 PROCEDURE — 6360000002 HC RX W HCPCS: Performed by: ANESTHESIOLOGY

## 2021-11-17 PROCEDURE — 6360000002 HC RX W HCPCS: Performed by: ORTHOPAEDIC SURGERY

## 2021-11-17 PROCEDURE — 6360000002 HC RX W HCPCS

## 2021-11-17 PROCEDURE — 85025 COMPLETE CBC W/AUTO DIFF WBC: CPT

## 2021-11-17 PROCEDURE — 7100000001 HC PACU RECOVERY - ADDTL 15 MIN: Performed by: ORTHOPAEDIC SURGERY

## 2021-11-17 PROCEDURE — 88311 DECALCIFY TISSUE: CPT

## 2021-11-17 PROCEDURE — 2580000003 HC RX 258: Performed by: PHYSICIAN ASSISTANT

## 2021-11-17 PROCEDURE — 72190 X-RAY EXAM OF PELVIS: CPT

## 2021-11-17 PROCEDURE — 7100000000 HC PACU RECOVERY - FIRST 15 MIN: Performed by: ORTHOPAEDIC SURGERY

## 2021-11-17 PROCEDURE — 3600000005 HC SURGERY LEVEL 5 BASE: Performed by: ORTHOPAEDIC SURGERY

## 2021-11-17 PROCEDURE — 7100000010 HC PHASE II RECOVERY - FIRST 15 MIN: Performed by: ORTHOPAEDIC SURGERY

## 2021-11-17 PROCEDURE — 2500000003 HC RX 250 WO HCPCS

## 2021-11-17 PROCEDURE — 80053 COMPREHEN METABOLIC PANEL: CPT

## 2021-11-17 PROCEDURE — 27215 TREAT PELVIC FRACTURE(S): CPT | Performed by: ORTHOPAEDIC SURGERY

## 2021-11-17 PROCEDURE — 6370000000 HC RX 637 (ALT 250 FOR IP): Performed by: ANESTHESIOLOGY

## 2021-11-17 RX ORDER — SODIUM CHLORIDE 9 MG/ML
25 INJECTION, SOLUTION INTRAVENOUS PRN
Status: DISCONTINUED | OUTPATIENT
Start: 2021-11-17 | End: 2021-11-17 | Stop reason: HOSPADM

## 2021-11-17 RX ORDER — LIDOCAINE HYDROCHLORIDE 20 MG/ML
INJECTION, SOLUTION INTRAVENOUS PRN
Status: DISCONTINUED | OUTPATIENT
Start: 2021-11-17 | End: 2021-11-17 | Stop reason: SDUPTHER

## 2021-11-17 RX ORDER — NEOSTIGMINE METHYLSULFATE 1 MG/ML
INJECTION, SOLUTION INTRAVENOUS PRN
Status: DISCONTINUED | OUTPATIENT
Start: 2021-11-17 | End: 2021-11-17 | Stop reason: SDUPTHER

## 2021-11-17 RX ORDER — DIPHENHYDRAMINE HYDROCHLORIDE 50 MG/ML
12.5 INJECTION INTRAMUSCULAR; INTRAVENOUS
Status: DISCONTINUED | OUTPATIENT
Start: 2021-11-17 | End: 2021-11-17 | Stop reason: HOSPADM

## 2021-11-17 RX ORDER — OXYCODONE HYDROCHLORIDE 5 MG/1
5 TABLET ORAL
Status: COMPLETED | OUTPATIENT
Start: 2021-11-17 | End: 2021-11-17

## 2021-11-17 RX ORDER — GLYCOPYRROLATE 1 MG/5 ML
SYRINGE (ML) INTRAVENOUS PRN
Status: DISCONTINUED | OUTPATIENT
Start: 2021-11-17 | End: 2021-11-17 | Stop reason: SDUPTHER

## 2021-11-17 RX ORDER — MIDAZOLAM HYDROCHLORIDE 1 MG/ML
INJECTION INTRAMUSCULAR; INTRAVENOUS PRN
Status: DISCONTINUED | OUTPATIENT
Start: 2021-11-17 | End: 2021-11-17 | Stop reason: SDUPTHER

## 2021-11-17 RX ORDER — ONDANSETRON 2 MG/ML
INJECTION INTRAMUSCULAR; INTRAVENOUS PRN
Status: DISCONTINUED | OUTPATIENT
Start: 2021-11-17 | End: 2021-11-17 | Stop reason: SDUPTHER

## 2021-11-17 RX ORDER — DEXAMETHASONE SODIUM PHOSPHATE 10 MG/ML
INJECTION INTRAMUSCULAR; INTRAVENOUS PRN
Status: DISCONTINUED | OUTPATIENT
Start: 2021-11-17 | End: 2021-11-17 | Stop reason: SDUPTHER

## 2021-11-17 RX ORDER — SODIUM CHLORIDE 0.9 % (FLUSH) 0.9 %
10 SYRINGE (ML) INJECTION PRN
Status: DISCONTINUED | OUTPATIENT
Start: 2021-11-17 | End: 2021-11-17 | Stop reason: HOSPADM

## 2021-11-17 RX ORDER — EPHEDRINE SULFATE/0.9% NACL/PF 50 MG/5 ML
SYRINGE (ML) INTRAVENOUS PRN
Status: DISCONTINUED | OUTPATIENT
Start: 2021-11-17 | End: 2021-11-17 | Stop reason: SDUPTHER

## 2021-11-17 RX ORDER — SODIUM CHLORIDE 0.9 % (FLUSH) 0.9 %
10 SYRINGE (ML) INJECTION EVERY 12 HOURS SCHEDULED
Status: DISCONTINUED | OUTPATIENT
Start: 2021-11-17 | End: 2021-11-17 | Stop reason: HOSPADM

## 2021-11-17 RX ORDER — ROCURONIUM BROMIDE 10 MG/ML
INJECTION, SOLUTION INTRAVENOUS PRN
Status: DISCONTINUED | OUTPATIENT
Start: 2021-11-17 | End: 2021-11-17 | Stop reason: SDUPTHER

## 2021-11-17 RX ORDER — KETOROLAC TROMETHAMINE 30 MG/ML
30 INJECTION, SOLUTION INTRAMUSCULAR; INTRAVENOUS ONCE
Status: COMPLETED | OUTPATIENT
Start: 2021-11-17 | End: 2021-11-17

## 2021-11-17 RX ORDER — OXYCODONE HYDROCHLORIDE AND ACETAMINOPHEN 5; 325 MG/1; MG/1
1 TABLET ORAL EVERY 6 HOURS PRN
Qty: 28 TABLET | Refills: 0 | Status: SHIPPED | OUTPATIENT
Start: 2021-11-17 | End: 2021-11-24

## 2021-11-17 RX ORDER — PROPOFOL 10 MG/ML
INJECTION, EMULSION INTRAVENOUS PRN
Status: DISCONTINUED | OUTPATIENT
Start: 2021-11-17 | End: 2021-11-17 | Stop reason: SDUPTHER

## 2021-11-17 RX ORDER — MIDAZOLAM HYDROCHLORIDE 2 MG/2ML
1 INJECTION, SOLUTION INTRAMUSCULAR; INTRAVENOUS EVERY 10 MIN PRN
Status: DISCONTINUED | OUTPATIENT
Start: 2021-11-17 | End: 2021-11-17 | Stop reason: HOSPADM

## 2021-11-17 RX ORDER — LIDOCAINE HYDROCHLORIDE AND EPINEPHRINE 10; 10 MG/ML; UG/ML
INJECTION, SOLUTION INFILTRATION; PERINEURAL PRN
Status: DISCONTINUED | OUTPATIENT
Start: 2021-11-17 | End: 2021-11-17 | Stop reason: ALTCHOICE

## 2021-11-17 RX ORDER — OXYCODONE HYDROCHLORIDE AND ACETAMINOPHEN 5; 325 MG/1; MG/1
1 TABLET ORAL PRN
Status: DISCONTINUED | OUTPATIENT
Start: 2021-11-17 | End: 2021-11-17 | Stop reason: HOSPADM

## 2021-11-17 RX ORDER — MEPERIDINE HYDROCHLORIDE 25 MG/ML
12.5 INJECTION INTRAMUSCULAR; INTRAVENOUS; SUBCUTANEOUS EVERY 5 MIN PRN
Status: DISCONTINUED | OUTPATIENT
Start: 2021-11-17 | End: 2021-11-17 | Stop reason: HOSPADM

## 2021-11-17 RX ORDER — OXYCODONE HYDROCHLORIDE AND ACETAMINOPHEN 5; 325 MG/1; MG/1
2 TABLET ORAL PRN
Status: DISCONTINUED | OUTPATIENT
Start: 2021-11-17 | End: 2021-11-17 | Stop reason: HOSPADM

## 2021-11-17 RX ORDER — FENTANYL CITRATE 50 UG/ML
INJECTION, SOLUTION INTRAMUSCULAR; INTRAVENOUS PRN
Status: DISCONTINUED | OUTPATIENT
Start: 2021-11-17 | End: 2021-11-17 | Stop reason: SDUPTHER

## 2021-11-17 RX ORDER — CEFAZOLIN SODIUM 1 G/3ML
INJECTION, POWDER, FOR SOLUTION INTRAMUSCULAR; INTRAVENOUS PRN
Status: DISCONTINUED | OUTPATIENT
Start: 2021-11-17 | End: 2021-11-17 | Stop reason: SDUPTHER

## 2021-11-17 RX ADMIN — FENTANYL CITRATE 100 MCG: 50 INJECTION, SOLUTION INTRAMUSCULAR; INTRAVENOUS at 09:17

## 2021-11-17 RX ADMIN — SODIUM CHLORIDE: 9 INJECTION, SOLUTION INTRAVENOUS at 09:00

## 2021-11-17 RX ADMIN — Medication 3 MG: at 10:03

## 2021-11-17 RX ADMIN — PROPOFOL 130 MG: 10 INJECTION, EMULSION INTRAVENOUS at 09:17

## 2021-11-17 RX ADMIN — HYDROMORPHONE HYDROCHLORIDE 0.5 MG: 1 INJECTION, SOLUTION INTRAMUSCULAR; INTRAVENOUS; SUBCUTANEOUS at 11:00

## 2021-11-17 RX ADMIN — SODIUM CHLORIDE 25 ML: 9 INJECTION, SOLUTION INTRAVENOUS at 07:47

## 2021-11-17 RX ADMIN — KETOROLAC TROMETHAMINE 30 MG: 30 INJECTION, SOLUTION INTRAMUSCULAR; INTRAVENOUS at 11:38

## 2021-11-17 RX ADMIN — HYDROMORPHONE HYDROCHLORIDE 0.5 MG: 1 INJECTION, SOLUTION INTRAMUSCULAR; INTRAVENOUS; SUBCUTANEOUS at 10:28

## 2021-11-17 RX ADMIN — HYDROMORPHONE HYDROCHLORIDE 0.5 MG: 1 INJECTION, SOLUTION INTRAMUSCULAR; INTRAVENOUS; SUBCUTANEOUS at 11:05

## 2021-11-17 RX ADMIN — Medication 0.6 MG: at 10:03

## 2021-11-17 RX ADMIN — MIDAZOLAM 2 MG: 1 INJECTION INTRAMUSCULAR; INTRAVENOUS at 09:08

## 2021-11-17 RX ADMIN — ROCURONIUM BROMIDE 40 MG: 10 INJECTION, SOLUTION INTRAVENOUS at 09:17

## 2021-11-17 RX ADMIN — SODIUM CHLORIDE: 9 INJECTION, SOLUTION INTRAVENOUS at 09:40

## 2021-11-17 RX ADMIN — Medication 10 MG: at 09:51

## 2021-11-17 RX ADMIN — CEFAZOLIN 2000 MG: 1 INJECTION, POWDER, FOR SOLUTION INTRAMUSCULAR; INTRAVENOUS at 09:21

## 2021-11-17 RX ADMIN — LIDOCAINE HYDROCHLORIDE 100 MG: 20 INJECTION, SOLUTION INTRAVENOUS at 09:17

## 2021-11-17 RX ADMIN — DEXAMETHASONE SODIUM PHOSPHATE 10 MG: 10 INJECTION INTRAMUSCULAR; INTRAVENOUS at 09:26

## 2021-11-17 RX ADMIN — HYDROMORPHONE HYDROCHLORIDE 0.5 MG: 1 INJECTION, SOLUTION INTRAMUSCULAR; INTRAVENOUS; SUBCUTANEOUS at 10:34

## 2021-11-17 RX ADMIN — MIDAZOLAM 1 MG: 1 INJECTION INTRAMUSCULAR; INTRAVENOUS at 11:57

## 2021-11-17 RX ADMIN — OXYCODONE 5 MG: 5 TABLET ORAL at 11:57

## 2021-11-17 RX ADMIN — ONDANSETRON HYDROCHLORIDE 4 MG: 2 INJECTION, SOLUTION INTRAMUSCULAR; INTRAVENOUS at 10:03

## 2021-11-17 ASSESSMENT — PULMONARY FUNCTION TESTS
PIF_VALUE: 2
PIF_VALUE: 23
PIF_VALUE: 20
PIF_VALUE: 21
PIF_VALUE: 6
PIF_VALUE: 0
PIF_VALUE: 22
PIF_VALUE: 2
PIF_VALUE: 20
PIF_VALUE: 22
PIF_VALUE: 7
PIF_VALUE: 23
PIF_VALUE: 21
PIF_VALUE: 1
PIF_VALUE: 2
PIF_VALUE: 22
PIF_VALUE: 1
PIF_VALUE: 23
PIF_VALUE: 2
PIF_VALUE: 20
PIF_VALUE: 23
PIF_VALUE: 20
PIF_VALUE: 22
PIF_VALUE: 22
PIF_VALUE: 23
PIF_VALUE: 23
PIF_VALUE: 0
PIF_VALUE: 23
PIF_VALUE: 22
PIF_VALUE: 23
PIF_VALUE: 21
PIF_VALUE: 22
PIF_VALUE: 23
PIF_VALUE: 21
PIF_VALUE: 2
PIF_VALUE: 3
PIF_VALUE: 21
PIF_VALUE: 0
PIF_VALUE: 0
PIF_VALUE: 22
PIF_VALUE: 21
PIF_VALUE: 22
PIF_VALUE: 23
PIF_VALUE: 23
PIF_VALUE: 0
PIF_VALUE: 20
PIF_VALUE: 21
PIF_VALUE: 2
PIF_VALUE: 22
PIF_VALUE: 22
PIF_VALUE: 23
PIF_VALUE: 2
PIF_VALUE: 10
PIF_VALUE: 1
PIF_VALUE: 21
PIF_VALUE: 23
PIF_VALUE: 0
PIF_VALUE: 22
PIF_VALUE: 23
PIF_VALUE: 2
PIF_VALUE: 3
PIF_VALUE: 21

## 2021-11-17 ASSESSMENT — PAIN SCALES - GENERAL
PAINLEVEL_OUTOF10: 5
PAINLEVEL_OUTOF10: 10
PAINLEVEL_OUTOF10: 9
PAINLEVEL_OUTOF10: 10
PAINLEVEL_OUTOF10: 8
PAINLEVEL_OUTOF10: 9
PAINLEVEL_OUTOF10: 10
PAINLEVEL_OUTOF10: 10

## 2021-11-17 ASSESSMENT — PAIN DESCRIPTION - ORIENTATION: ORIENTATION: LEFT

## 2021-11-17 ASSESSMENT — PAIN DESCRIPTION - PROGRESSION: CLINICAL_PROGRESSION: GRADUALLY IMPROVING

## 2021-11-17 ASSESSMENT — PAIN DESCRIPTION - PAIN TYPE: TYPE: SURGICAL PAIN

## 2021-11-17 ASSESSMENT — PAIN DESCRIPTION - DESCRIPTORS: DESCRIPTORS: ACHING;BURNING;CONSTANT

## 2021-11-17 ASSESSMENT — PAIN - FUNCTIONAL ASSESSMENT: PAIN_FUNCTIONAL_ASSESSMENT: 0-10

## 2021-11-17 ASSESSMENT — PAIN DESCRIPTION - LOCATION: LOCATION: HIP

## 2021-11-17 ASSESSMENT — PAIN DESCRIPTION - FREQUENCY: FREQUENCY: CONTINUOUS

## 2021-11-17 ASSESSMENT — PAIN DESCRIPTION - ONSET: ONSET: ON-GOING

## 2021-11-17 NOTE — ANESTHESIA PRE PROCEDURE
Department of Anesthesiology  Preprocedure Note       Name:  Claudy Wallace   Age:  54 y.o.  :  1965                                          MRN:  [id number]         Date:  2021      Surgeon: Yanci Bell):  Nakia Centeno DO    Procedure: Procedure(s):  LEFT ILLIUM REMOVAL OF HETEROTOPIC OSSIFICATION V REVISION OF FRACTURE FRAGMENT    Medications prior to admission:   Prior to Admission medications    Medication Sig Start Date End Date Taking? Authorizing Provider   oxyCODONE (ROXICODONE) 20 MG immediate release tablet Take 20 mg by mouth every 8 hours as needed. 10/28/21   Historical Provider, MD   amphetamine-dextroamphetamine (ADDERALL, 10MG,) 10 MG tablet Take 10 mg by mouth daily. Historical Provider, MD       Current medications:    No current facility-administered medications for this visit. No current outpatient medications on file.      Facility-Administered Medications Ordered in Other Visits   Medication Dose Route Frequency Provider Last Rate Last Admin    0.9 % sodium chloride infusion  25 mL IntraVENous PRN Mendez Alicea PA-C 100 mL/hr at 21 0747 25 mL at 21 0747    sodium chloride flush 0.9 % injection 10 mL  10 mL IntraVENous 2 times per day Mendez Alicea PA-C        sodium chloride flush 0.9 % injection 10 mL  10 mL IntraVENous PRN Mendez Alicea PA-C        meperidine (DEMEROL) injection 12.5 mg  12.5 mg IntraVENous Q5 Min PRN Arthur Weir MD        diphenhydrAMINE (BENADRYL) injection 12.5 mg  12.5 mg IntraVENous Once PRN Arthur Weir MD        oxyCODONE-acetaminophen (PERCOCET) 5-325 MG per tablet 1 tablet  1 tablet Oral PRN Arthur Weir MD        Or    oxyCODONE-acetaminophen (PERCOCET) 5-325 MG per tablet 2 tablet  2 tablet Oral PRN Arthur Weir MD        HYDROmorphone (DILAUDID) injection 0.5 mg  0.5 mg IntraVENous Q5 Min PRN Arthur Weir MD        HYDROmorphone (DILAUDID) injection 0.25 mg  0.25 mg Answered      Vital Signs (Current): There were no vitals filed for this visit. BP Readings from Last 3 Encounters:   11/17/21 137/86   04/30/21 124/82   03/19/21 107/81       NPO Status:                                                                                 BMI:   Wt Readings from Last 3 Encounters:   11/17/21 160 lb (72.6 kg)   08/30/21 160 lb (72.6 kg)   07/19/21 160 lb (72.6 kg)     There is no height or weight on file to calculate BMI.    CBC:   Lab Results   Component Value Date    WBC 7.1 08/13/2020    RBC 4.56 08/13/2020    HGB 14.2 08/13/2020    HCT 39.9 08/13/2020    MCV 87.5 08/13/2020    RDW 12.5 08/13/2020     08/13/2020       CMP:   Lab Results   Component Value Date     08/13/2020    K 4.2 08/13/2020     08/13/2020    CO2 27 08/13/2020    BUN 19 08/13/2020    CREATININE 0.8 08/13/2020    GFRAA >60 08/13/2020    LABGLOM >60 08/13/2020    GLUCOSE 112 08/13/2020    PROT 6.6 08/13/2020    CALCIUM 9.3 08/13/2020    BILITOT 1.7 08/13/2020    ALKPHOS 47 08/13/2020    AST 47 08/13/2020    ALT 44 08/13/2020       POC Tests: No results for input(s): POCGLU, POCNA, POCK, POCCL, POCBUN, POCHEMO, POCHCT in the last 72 hours.     Coags:   Lab Results   Component Value Date    PROTIME 12.0 08/13/2020    INR 1.1 08/13/2020    APTT 30.8 08/13/2020       HCG (If Applicable): No results found for: PREGTESTUR, PREGSERUM, HCG, HCGQUANT     ABGs: No results found for: PHART, PO2ART, RRD1MJQ, XXO8KXW, BEART, Y3WUARSK     Type & Screen (If Applicable):  No results found for: LABABO, LABRH    Drug/Infectious Status (If Applicable):  No results found for: HIV, HEPCAB    COVID-19 Screening (If Applicable):   Lab Results   Component Value Date    COVID19 Not Detected 11/12/2021           Anesthesia Evaluation  Patient summary reviewed and Nursing notes reviewed no history of anesthetic complications:   Airway: Mallampati: II  TM distance: >3 FB   Neck ROM: full  Mouth opening: > = 3 FB Dental: normal exam         Pulmonary:Negative Pulmonary ROS breath sounds clear to auscultation                            ROS comment: History of smoking   Cardiovascular:Negative CV ROS            Rhythm: regular  Rate: normal           Beta Blocker:  Not on Beta Blocker      ROS comment: EKG: NSR, left anterior fascicular block, abnormal EKG     Neuro/Psych:   (+) psychiatric history ( H/O ADHD):            GI/Hepatic/Renal:   (+) renal disease: kidney stones,           Endo/Other: Negative Endo/Other ROS                     ROS comment: Chronic amphetamine therapy Abdominal:             Vascular: negative vascular ROS. Other Findings:               Anesthesia Plan      general     ASA 3       Induction: intravenous. MIPS: Postoperative opioids intended and Prophylactic antiemetics administered. Anesthetic plan and risks discussed with patient. Plan discussed with OLENA. Gabriela Guerrero MD   11/17/2021    Chart reviewed, findings discussed with anesthesiologist. Anesthesia plan of care discussed with kamilla Aleman CRNA

## 2021-11-17 NOTE — INTERVAL H&P NOTE
Update History & Physical    The patient's History and Physical of November 5, 2021 was reviewed with the patient and I examined the patient. There was no change. The surgical site was confirmed by the patient and me. Plan: Left iliac fracture open treatment with internal fixation versus fracture excision    I have explained the risks and complications of the recommended surgery with the patient at length, as well as discussed potential treatment alternatives including nonoperative management. These risks include but are not limited to death or complication from anesthesia, continued pain, nerve tendon or vascular injury, infection, nonunion or malunion, symptomatic hardware or hardware failure, deep vein thrombosis or pulmonary embolism, lateral femoral cutaneous nerve palsy, numbness, unforeseen complications, and need for further surgery, etc.  Patient understood this, asked appropriate questions, which were all answered, and he has elected to proceed with the procedure.       Electronically signed by Jamal Ding DO on 11/17/2021 at 7:53 AM

## 2021-11-17 NOTE — OP NOTE
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    Post-Op Diagnosis: Same       Procedure(s):  Open treatment left ilium avulsion fracture with fragment excision along with excision heterotopic ossification    Surgeon(s):  Magdalena Mcclure DO    Assistant:   Resident: Alfredo Raygoza DO; Romy Marie DO    Anesthesia: General    Estimated Blood Loss (mL): less than 978     Complications: None    Specimens:   ID Type Source Tests Collected by Time Destination   A : Left Ilium Heterotopic Ossification Tissue Hip Školthad 1690, DO 11/17/2021 1450        Implants:  * No implants in log *      Drains: * No LDAs found *    Findings: HO left ilium with nonunion. Detailed Description of Procedure: The patient was identified outside the OR in the holding area. The site was agree upon by the patient and staff and was marked. The patient was taken to the OR and transferred to the operative table. All bony prominences were padded. The patient was sedated by the anesthesia team.     The area was prepped and draped in a normal sterile fashion. An incision was marked out over the iliac crest and ASIS. The skin was incised with a 10 blade scalpel. Bovie cautery was taken down through the fascia, care was taken to preserve good fascial layers for closure. There was a significant mount of scar tissue once through the investing fascia incorporating to the periosteum. Care was taken to protect the lateral femoral cutaneous nerve throughout the case. There is a large region of scar tissue traversing the nonunion site. . A large periosteal flap was made starting on the intact ilium and working way distally over the avulsed fracture fragments and over the heterotopic bone.   There was some heterotopic bone on the lateral aspect of the intact ilium which was removed with a rongeur. There was not a good base for stabilization and repair of the fracture site due to the significant scar tissue about the distal segment as well as the heterotopic bone. We felt was most appropriate for excision of the fragment at this time as there was A portion of the sartorius was still attached to the ASIS more proximally. There was a small portion attached to the nonunion fragment. The avulsed fragment of the ileum as well as the heterotopic bone was now carefully skeletonized using electrocautery and blunt dissection we are able to move this fracture fragment in 1 piece. Fluoroscopy was now utilized confirming appropriate fragmented nature removal.  The tendinous insertion on the pieces more distally were then reapproximated proximally using suture closing the dead space and incorporating the local scar tissue. The portion of the sartorius was reattached using multiple figure of 8 Ethibond sutures. Wounds were thoroughly irrigated the investing fascia and periosteum as well as subcutaneous tissues and skin were then closed in standard layered fashion. The wound was then dressed with a sterile aquacell bandage. He was then awaken from anesthesia transferred back to his hospital bed and taken to the pacu in stable condition. It should be noted that Dr. Geovany Barahona was present for the entirety of the procedure. Disposition  He will be discharged home today. He can be weightbearing as tolerated. He will follow up in 2 weeks for suture removal.    Electronically signed by Lai Adkins DO on 11/17/2021 at 10:22 AM    Electronically Signed By  Ra Lanier D.O.  11/17/2021    NOTE: This report was transcribed using voice recognition software.  Every effort was made to ensure accuracy; however, inadvertent computerized transcription errors may be present

## 2021-11-23 ENCOUNTER — TELEPHONE (OUTPATIENT)
Dept: ORTHOPEDIC SURGERY | Age: 56
End: 2021-11-23

## 2021-11-23 DIAGNOSIS — S32.312S: Primary | ICD-10-CM

## 2021-11-23 NOTE — TELEPHONE ENCOUNTER
Received call from patient requesting call back regarding a Medco-14. Return call placed to at this time. Informed patient that a new Medco-14 would be filled out and sent to his [facility] with a start date of 11/13/2021 as his last day of work prior to surgery was 11/12/2021. Patient agreeable to this and verbalized understanding.     Future Appointments   Date Time Provider Maria Guadalupe Bernal   11/30/2021 10:00 AM SCHEDULE, SE ORTHO [facility]   12/21/2021  2:10 PM Jewell Bunch DO [facility]   12/28/2021  8:30 AM SCHEDULE, SE ORTHO [facility]   2/10/2022 10:30 AM Mian Leung DO  [facility]

## 2021-11-24 DIAGNOSIS — Z98.890 HISTORY OF SURGERY: ICD-10-CM

## 2021-11-24 NOTE — TELEPHONE ENCOUNTER
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      Pharmacy: [address] [place] [address]

## 2021-11-26 RX ORDER — OXYCODONE HYDROCHLORIDE AND ACETAMINOPHEN 5; 325 MG/1; MG/1
1 TABLET ORAL EVERY 6 HOURS PRN
Qty: 28 TABLET | Refills: 0 | OUTPATIENT
Start: 2021-11-26 | End: 2021-12-03

## 2021-11-29 ENCOUNTER — TELEPHONE (OUTPATIENT)
Dept: ORTHOPEDIC SURGERY | Age: 56
End: 2021-11-29

## 2021-11-29 VITALS
HEART RATE: 83 BPM | TEMPERATURE: 98.4 F | BODY MASS INDEX: 24.33 KG/M2 | SYSTOLIC BLOOD PRESSURE: 151 MMHG | WEIGHT: 160 LBS | RESPIRATION RATE: 16 BRPM | OXYGEN SATURATION: 97 % | DIASTOLIC BLOOD PRESSURE: 96 MMHG

## 2021-11-29 LAB
ALBUMIN SERPL-MCNC: 4.6 G/DL (ref 3.5–5.2)
ALP BLD-CCNC: 70 U/L (ref 40–129)
ALT SERPL-CCNC: 35 U/L (ref 0–40)
ANION GAP SERPL CALCULATED.3IONS-SCNC: 13 MMOL/L (ref 7–16)
AST SERPL-CCNC: 24 U/L (ref 0–39)
BASOPHILS ABSOLUTE: 0.04 E9/L (ref 0–0.2)
BASOPHILS RELATIVE PERCENT: 0.5 % (ref 0–2)
BILIRUB SERPL-MCNC: 0.7 MG/DL (ref 0–1.2)
BUN BLDV-MCNC: 19 MG/DL (ref 6–20)
CALCIUM SERPL-MCNC: 10 MG/DL (ref 8.6–10.2)
CHLORIDE BLD-SCNC: 102 MMOL/L (ref 98–107)
CO2: 27 MMOL/L (ref 22–29)
CREAT SERPL-MCNC: 0.9 MG/DL (ref 0.7–1.2)
EOSINOPHILS ABSOLUTE: 0.04 E9/L (ref 0.05–0.5)
EOSINOPHILS RELATIVE PERCENT: 0.5 % (ref 0–6)
GFR AFRICAN AMERICAN: >60
GFR NON-AFRICAN AMERICAN: >60 ML/MIN/1.73
GLUCOSE BLD-MCNC: 113 MG/DL (ref 74–99)
HCT VFR BLD CALC: 42.9 % (ref 37–54)
HEMOGLOBIN: 15.3 G/DL (ref 12.5–16.5)
IMMATURE GRANULOCYTES #: 0.02 E9/L
IMMATURE GRANULOCYTES %: 0.3 % (ref 0–5)
LACTIC ACID: 1.2 MMOL/L (ref 0.5–2.2)
LYMPHOCYTES ABSOLUTE: 1.48 E9/L (ref 1.5–4)
LYMPHOCYTES RELATIVE PERCENT: 19.1 % (ref 20–42)
MCH RBC QN AUTO: 29.2 PG (ref 26–35)
MCHC RBC AUTO-ENTMCNC: 35.7 % (ref 32–34.5)
MCV RBC AUTO: 81.9 FL (ref 80–99.9)
MONOCYTES ABSOLUTE: 0.5 E9/L (ref 0.1–0.95)
MONOCYTES RELATIVE PERCENT: 6.5 % (ref 2–12)
NEUTROPHILS ABSOLUTE: 5.67 E9/L (ref 1.8–7.3)
NEUTROPHILS RELATIVE PERCENT: 73.1 % (ref 43–80)
PDW BLD-RTO: 12.4 FL (ref 11.5–15)
PLATELET # BLD: 257 E9/L (ref 130–450)
PMV BLD AUTO: 8.1 FL (ref 7–12)
POTASSIUM REFLEX MAGNESIUM: 4.2 MMOL/L (ref 3.5–5)
RBC # BLD: 5.24 E12/L (ref 3.8–5.8)
SODIUM BLD-SCNC: 142 MMOL/L (ref 132–146)
TOTAL PROTEIN: 7.2 G/DL (ref 6.4–8.3)
WBC # BLD: 7.8 E9/L (ref 4.5–11.5)

## 2021-11-29 PROCEDURE — 85025 COMPLETE CBC W/AUTO DIFF WBC: CPT

## 2021-11-29 PROCEDURE — 80053 COMPREHEN METABOLIC PANEL: CPT

## 2021-11-29 PROCEDURE — 4500000002 HC ER NO CHARGE

## 2021-11-29 PROCEDURE — 83605 ASSAY OF LACTIC ACID: CPT

## 2021-11-29 PROCEDURE — 87040 BLOOD CULTURE FOR BACTERIA: CPT

## 2021-11-29 ASSESSMENT — PAIN DESCRIPTION - ORIENTATION: ORIENTATION: LEFT

## 2021-11-29 ASSESSMENT — PAIN SCALES - GENERAL: PAINLEVEL_OUTOF10: 8

## 2021-11-29 ASSESSMENT — PAIN DESCRIPTION - LOCATION: LOCATION: HIP

## 2021-11-29 NOTE — ED TRIAGE NOTES
FIRST PROVIDER CONTACT ASSESSMENT NOTE           Department of Emergency Medicine                 First Provider Note            21  6:02 PM EST    Date of Encounter: No admission date for patient encounter. Patient Name: Giorgio Nick  : 1965  MRN: [id number]    Chief Complaint: Wound Check (Had surgery on left hip on the , is now red and swollen. ) and Migraine (MIgrane that started 3 days ago. )      History of Present Illness:   Giorgio Nick is a 54 y.o. male who presents to the ED for erythema and swelling to hip. Surgery on . Incision started becoming red and swollen on . Also reports headache. Dr. Tiana Lxu did surgery. Focused Physical Exam:  VS:    ED Triage Vitals [21 1704]   BP Temp Temp src Pulse Resp SpO2 Height Weight   -- 98.4 °F (36.9 °C) -- 83 -- 97 % -- --        Physical Ex: Constitutional: Alert and non-toxic. Medical History:  has a past medical history of ADHD, Kidney stones, and Work related injury. Surgical History:  has a past surgical history that includes knee surgery (Bilateral); fracture surgery; Colonoscopy; Shoulder arthroscopy (Right, 2020); Shoulder arthroscopy (Right, 2020); Shoulder arthroscopy (Left, 2021); Shoulder arthroscopy (Left, 2021); Shoulder arthroscopy (Left, 3/19/2021); and Pelvic fracture surgery (Left, 2021). Social History:  reports that he has quit smoking. His smoking use included cigarettes. He quit after 10.00 years of use. He has never used smokeless tobacco. He reports that he does not drink alcohol and does not use drugs. Family History: family history includes Heart Failure in his father; No Known Problems in his mother. Allergies:  Iv dye [iodides]     Initial Plan of Care: Initiate Treatment-Testing, Proceed toTreatment Area When Bed Available for ED Attending/MLP to Continue Care      ---END OF FIRST PROVIDER CONTACT ASSESSMENT NOTE---  Electronically signed by KJ Simental DD: 11/29/21

## 2021-11-29 NOTE — TELEPHONE ENCOUNTER
Received call from patient requesting callback regarding incision. Patient states that his incision is swollen and red. He states that this began on Thursday, 11/25/2021. He states that he has had ice on for the last few days with no relief in pain or swelling. He states that the area around the incision feels hard. He reports having headache for the last 3 days. Patient also described the sutures as \"looking like they are bulging out. \" Patient denies any drainage from incision. Instructed patient that he should go to Emergency department for evaluation to rule out infection or hematoma. Patient agreeable to this plan and will call office with any other needs or concerns.     Future Appointments   Date Time Provider Maria Guadalupe Bernal   12/3/2021 10:00 AM SCHEDULE, SE ORTHO RES [facility]   12/21/2021  2:10 PM Aurora Lane DO [facility]   12/28/2021  8:30 AM SCHEDULE, SE ORTHO [facility]   2/10/2022 10:30 AM Mian Arriaga DO [facility]

## 2021-11-30 ENCOUNTER — TELEPHONE (OUTPATIENT)
Dept: ORTHOPEDIC SURGERY | Age: 56
End: 2021-11-30

## 2021-11-30 ENCOUNTER — HOSPITAL ENCOUNTER (EMERGENCY)
Age: 56
Discharge: LEFT BEFORE DISCHARGE | End: 2021-11-30
Payer: COMMERCIAL

## 2021-11-30 DIAGNOSIS — Z98.890 HISTORY OF SURGERY: ICD-10-CM

## 2021-11-30 RX ORDER — OXYCODONE HYDROCHLORIDE AND ACETAMINOPHEN 5; 325 MG/1; MG/1
1 TABLET ORAL EVERY 8 HOURS PRN
Qty: 21 TABLET | Refills: 0 | Status: CANCELLED | OUTPATIENT
Start: 2021-11-30 | End: 2021-12-07

## 2021-11-30 NOTE — TELEPHONE ENCOUNTER
Received 3 VM's from pt and his wife requesting callback regarding incision. Call placed to pt, states he went to ER last night and they epi some blood, he waited and hour and a half \"for a doctor to get off his ass to see me but that was wasted time\" and he left without being seen. Pt stated he would like to be seen today as he feels there is a blood clot under incision. Pt denies any drainage to incision site, denies fever or chills. Requested picture of incision be sent to office for review and will call back. Pt stated he will send pictures.

## 2021-11-30 NOTE — TELEPHONE ENCOUNTER
Call placed to pt, [facility], he verbalized understanding, agreeable to keeping appt on Friday 12/3. Pt also requesting refill of Percocet. Date of Procedure: 11/17/2021  Procedure(s):  Open treatment left ilium avulsion fracture with fragment excision along with excision heterotopic ossification    Order pended and routed for decision and signature.

## 2021-11-30 NOTE — TELEPHONE ENCOUNTER
Call placed to pt, notified to contact pain management regarding breakthrough pain as pt recently filled Oxy 20mg on 11/24. Pt verbalized understanding, questions answered.

## 2021-11-30 NOTE — TELEPHONE ENCOUNTER
Patient is chronically on Oxycodone IR 20 mg TID from Mikhail Martinez NP at [facility] [address]  He has filled this since surgery.  I recommend that he touch base with his pain management prescriber for pain medication refill for breakthrough pain    Date of Procedure: 11/17/2021  Procedure(s): Open treatment left ilium avulsion fracture with fragment excision along with excision heterotopic ossification  Surgeon(s): Jessica Dixon DO    Electronically signed by Chadwick Bailey PA-C on 11/30/2021 at 2:47 PM

## 2021-11-30 NOTE — TELEPHONE ENCOUNTER
Images reviewed, there is mild swelling at incision recommend he ice the area. The images provided do not show signs of infection, there appears to be irritation surrounding from dressing/adhesive. There is mild suture irritation which is very common being on the anterior pelvis. Recommend patient keep appt Friday.   Future Appointments   Date Time Provider Maria Guadalupe Bernal   12/3/2021 10:00 AM SCHEDULE, SE ORTHO RES  [facility]   12/21/2021  2:10 PM Kevin Guaman DO [facility]   12/28/2021  8:30 AM SCHEDULE, SE ORTHO APC [facility]   2/10/2022 10:30 AM DO [facility]     Electronically signed by Perry Bell PA-C on 11/30/2021 at 11:32 AM

## 2021-12-03 ENCOUNTER — HOSPITAL ENCOUNTER (OUTPATIENT)
Dept: GENERAL RADIOLOGY | Age: 56
Discharge: HOME | End: 2021-12-05
Payer: COMMERCIAL

## 2021-12-03 ENCOUNTER — OFFICE VISIT (OUTPATIENT)
Dept: ORTHOPEDIC SURGERY | Age: 56
End: 2021-12-03
Payer: COMMERCIAL

## 2021-12-03 VITALS — TEMPERATURE: 98.2 F

## 2021-12-03 DIAGNOSIS — M89.8X9 HETEROTOPIC OSSIFICATION OF BONE: Primary | ICD-10-CM

## 2021-12-03 DIAGNOSIS — S32.312S: ICD-10-CM

## 2021-12-03 PROCEDURE — 99024 POSTOP FOLLOW-UP VISIT: CPT | Performed by: ORTHOPAEDIC SURGERY

## 2021-12-03 PROCEDURE — 99212 OFFICE O/P EST SF 10 MIN: CPT | Performed by: ORTHOPAEDIC SURGERY

## 2021-12-03 PROCEDURE — 72190 X-RAY EXAM OF PELVIS: CPT

## 2021-12-03 RX ORDER — IBUPROFEN 800 MG/1
800 TABLET ORAL 2 TIMES DAILY PRN
Qty: 60 TABLET | Refills: 0 | Status: SHIPPED
Start: 2021-12-03 | End: 2021-12-28 | Stop reason: SDUPTHER

## 2021-12-03 NOTE — PATIENT INSTRUCTIONS
Weightbearing as tolerated left lower extremity  Order for PT given  Ibuprofen prescription sent  Follow-up in clinic in 4 weeks for repeat evaluation and x-rays  Call the clinic if you have any questions or concerns before your appointment.

## 2021-12-03 NOTE — PROGRESS NOTES
Chief Complaint   Patient presents with    Follow-up     2wk post-op L ilium removal of HO. Pain 7/10, denies numbness or tingling, fever or chills. Presents WBAT with cane    Other     XR in EPIC       SUBJECTIVE: Patient is a 60-year-old male who returns 2 weeks status post removal of ilium avulsion fracture with removal of heterotopic ossification. Patient states that he has been doing well since surgery. He states that he recently went to the emergency department for concerns of infection but left before being seen. He states that his incision feels hard and is mildly painful. He states that he has been ambulating with a cane with mild discomfort. He states that he has been doing chores around the house with minimal discomfort. He is accompanied by his wife in the room today. Review of Systems -   General ROS: negative for - chills, fatigue, fever or night sweats  Respiratory ROS: no cough, shortness of breath, or wheezing  Cardiovascular ROS: no chest pain or dyspnea on exertion  Gastrointestinal ROS: no abdominal pain, change in bowel habits, or black or bloody stools  Genitourinary: no hematuria, dysuria, or incontinence   Musculoskeletal ROS:see above  Neurological ROS: no TIA or stroke symptoms       OBJECTIVE:   Alert and oriented X 3, no acute distress, respirations easy and unlabored with no audible wheezes, skin warm and dry, speech and dress appropriate for noted age, affect euthymic. Extremity:  Left Lower Extremity  Skin clean dry and intact, without signs of infection, sutures intact without any signs of erythema or drainage. Erythema appropriate for stages of healing. Ecchymosis noted to the inguinal and lateral buttock region. Trace edema noted over the incision. Compartments supple throughout thigh and leg  Calf supple and not tender  negative Jung's  Demonstrates active motor function of TIA, EHL, GS, quadriceps.   States sensation intact to touch in sural, deep peroneal, superficial peroneal, saphenous, posterior tibial  nerve distributions to foot/ankle. 2+ dorsalis pedis and posterior tibialis pulses, cap refill brisk in toes, foot warm/perfused. XR: 12/3/21 AP, Judet views of the pelvis ordered, then reviewed demonstrating no acute fracture dislocations. There is no interval change since postoperative x-rays. No other bony or soft tissue abnormalities noted at this time. Temp 98.2 °F (36.8 °C)     ASSESSMENT: 2-week status post open treatment left ilium avulsion fracture with fragment excision along with excision heterotopic ossification    PLAN:  Weightbearing as tolerated left lower extremity  PT as an outpatient  Ibuprofen Rx sent  Return to clinic in 4 weeks for repeat evaluation and xrays  Please contact clinic if you have any questions or concerns before your appointment      Electronically signed by Darling Dowling DO on 12/3/2021 at 11:12 AM  Note: This report was completed using [employer] voiced recognition software. Every effort has been made to ensure accuracy; however, inadvertent computerized transcription errors may be present. 2-week status post HL removal.  No change in x-ray. Incisions benign. Plan on follow-up in 4 weeks for x-rays and wound check. Also clinical examination. Patient agreeable with the plan.

## 2021-12-04 LAB
BLOOD CULTURE, ROUTINE: NORMAL
CULTURE, BLOOD 2: NORMAL

## 2021-12-13 ENCOUNTER — TELEPHONE (OUTPATIENT)
Dept: ORTHOPEDIC SURGERY | Age: 56
End: 2021-12-13

## 2021-12-13 NOTE — TELEPHONE ENCOUNTER
Received call from patient with questions regarding medications from his last appointment as well as [facility] paperwork. Return call placed to patient at this time. Spoke to patient and informed him that he Ibuprofen prescription was sent to his pharmacy on 12/3/2021 and the pharmacy confirmed electronic receipt at 11:20 am that day. Also informed patient that I completed and faxed his [employer] form to [facility] on 12/8/2021. Instructed patient to call me if anything needed re-faxed to his [address] [place] [address] . Patient verbalized understanding.     Future Appointments   Date Time Provider Maria Guadalupe Bernal   12/21/2021  2:10 PM DO Una Irizarry [facility]   12/28/2021 11:30 AM SCHEDULE, [facility]   2/10/2022 10:30 AM Ana Kim DO [facility]

## 2021-12-22 DIAGNOSIS — S32.312S: Primary | ICD-10-CM

## 2021-12-28 ENCOUNTER — HOSPITAL ENCOUNTER (OUTPATIENT)
Dept: GENERAL RADIOLOGY | Age: 56
Discharge: HOME | End: 2021-12-30
Payer: COMMERCIAL

## 2021-12-28 ENCOUNTER — OFFICE VISIT (OUTPATIENT)
Dept: ORTHOPEDIC SURGERY | Age: 56
End: 2021-12-28
Payer: COMMERCIAL

## 2021-12-28 VITALS — TEMPERATURE: 97.2 F

## 2021-12-28 DIAGNOSIS — S32.312S: Primary | ICD-10-CM

## 2021-12-28 DIAGNOSIS — S32.312S: ICD-10-CM

## 2021-12-28 PROCEDURE — 99024 POSTOP FOLLOW-UP VISIT: CPT | Performed by: PHYSICIAN ASSISTANT

## 2021-12-28 PROCEDURE — 72190 X-RAY EXAM OF PELVIS: CPT

## 2021-12-28 PROCEDURE — 99212 OFFICE O/P EST SF 10 MIN: CPT | Performed by: PHYSICIAN ASSISTANT

## 2021-12-28 RX ORDER — IBUPROFEN 800 MG/1
800 TABLET ORAL 2 TIMES DAILY PRN
Qty: 60 TABLET | Refills: 0 | Status: SHIPPED | OUTPATIENT
Start: 2021-12-28

## 2022-02-10 ENCOUNTER — HOSPITAL ENCOUNTER (OUTPATIENT)
Dept: GENERAL RADIOLOGY | Age: 57
Discharge: HOME | End: 2022-02-12
Payer: COMMERCIAL

## 2022-02-10 ENCOUNTER — OFFICE VISIT (OUTPATIENT)
Dept: ORTHOPEDIC SURGERY | Age: 57
End: 2022-02-10
Payer: COMMERCIAL

## 2022-02-10 VITALS — TEMPERATURE: 98.1 F

## 2022-02-10 DIAGNOSIS — S32.312S: Primary | ICD-10-CM

## 2022-02-10 DIAGNOSIS — S32.312S: ICD-10-CM

## 2022-02-10 DIAGNOSIS — M89.8X9 HETEROTOPIC OSSIFICATION OF BONE: ICD-10-CM

## 2022-02-10 PROCEDURE — 99024 POSTOP FOLLOW-UP VISIT: CPT | Performed by: PHYSICIAN ASSISTANT

## 2022-02-10 PROCEDURE — 99212 OFFICE O/P EST SF 10 MIN: CPT | Performed by: PHYSICIAN ASSISTANT

## 2022-02-10 PROCEDURE — 72190 X-RAY EXAM OF PELVIS: CPT

## 2022-02-10 NOTE — PROGRESS NOTES
Patient presents today for 12 week check for LT Ilium, Removal of HO vs Revision of Fx Frag, DOS 11-17-21 by BREE;GP is 2-15-22. Patient states he \"feels like he has an elephant sitting onto his pelvis\", he states heat/ice helps however only temporary. Patient has no other questions at this time.

## 2022-02-10 NOTE — LETTER
[address]  [address]  [address] [facility] [address]  Phone: [phone]  Fax: [phone]        February 10, 2022     Patient: Keely Romero   YOB: 1965   Date of Visit: 2/10/2022       To Whom It May Concern: It is my medical opinion that Alfredo Neves may return to work on 2-21-22 with restrictions consisting of no heavy lifting, pushing or pulling. Recommend no lifting greater than 20 pounds. Also no climbing/ladders. We will discuss possibly lifting restrictions at his next office visit in 8 weeks. .    If you have any questions or concerns, please don't hesitate to call.     Sincerely,        KJ Roberts

## 2022-02-10 NOTE — PROGRESS NOTES
Chief Complaint   Patient presents with    Follow-up     12 week check for LT Ilium, Removal of HO vs Revision of Fx Frag, DOS 11-17-21 by JVG;GP is 2-15-22        OP:SURGEON: Dr. Eveline Canada DO  DATE OF PROCEDURE: 11-17-21  PROCEDURE: Open treatment left ilium avulsion fracture with fragment excision along with excision heterotopic ossification     POD: 3 months    Subjective:  Jackie Hand Core is following up from the above surgery. He is WBAT on left lower extremity. He ambulates with no assistive device. Pain to extremity is moderate and is not taking prescribed pain medication. They denies numbness or tingling to the left lower extremity. Denies calf pain, chest pain, or shortness of breath. Patient has finished DVT prophylaxis. Patient is not participating in therapy at this time. Overall he is doing better after surgery. He states that he does have pain in the groin bilaterally. Also complains of some intermittent left hip pain. He has not done PT but states that he did exercises on his own at home. 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 Review of Systems -  All pertinent positives/negative in HPI     Objective:    General: Alert and oriented X 3, normocephalic atraumatic, external ears and eye normal, sclera clear, no acute distress, respirations easy and unlabored with no audible wheezes, skin warm and dry, speech and dress appropriate for noted age, affect euthymic. Extremity:  Left Lower Extremity  Skin clean dry and intact, without signs of infection   Incision well-healed  no edema noted  Compartments supple throughout thigh and leg  Calf supple and not tender  negative Homans  Demonstrates active motion with hip flexion and abduction, knee flexion/extension ankle dorsi/plantar flexion. Ambulates with no assistive devices.   States sensation intact to touch in sural, deep peroneal, superficial peroneal, saphenous, posterior tibial  nerve distributions to

## 2022-03-22 ENCOUNTER — OFFICE VISIT (OUTPATIENT)
Dept: ORTHOPEDIC SURGERY | Age: 57
End: 2022-03-22
Payer: COMMERCIAL

## 2022-03-22 VITALS — BODY MASS INDEX: 25.01 KG/M2 | HEIGHT: 68 IN | WEIGHT: 165 LBS | TEMPERATURE: 98 F

## 2022-03-22 DIAGNOSIS — S46.812D TRAUMATIC RUPTURE OF SUPRASPINATUS TENDON OF LEFT SHOULDER, SUBSEQUENT ENCOUNTER: ICD-10-CM

## 2022-03-22 DIAGNOSIS — S46.811D TRAUMATIC RUPTURE OF SUPRASPINATUS TENDON OF RIGHT SHOULDER, SUBSEQUENT ENCOUNTER: Primary | ICD-10-CM

## 2022-03-22 PROCEDURE — 99212 OFFICE O/P EST SF 10 MIN: CPT | Performed by: ORTHOPAEDIC SURGERY

## 2022-03-22 RX ORDER — CELECOXIB 200 MG/1
200 CAPSULE ORAL DAILY
Qty: 30 CAPSULE | Refills: 3 | Status: SHIPPED | OUTPATIENT
Start: 2022-03-22 | End: 2022-07-20

## 2022-03-22 RX ORDER — METHYLPREDNISOLONE 4 MG/1
4 TABLET ORAL SEE ADMIN INSTRUCTIONS
Qty: 1 KIT | Refills: 0 | Status: SHIPPED | OUTPATIENT
Start: 2022-03-22 | End: 2022-03-28

## 2022-03-22 NOTE — PROGRESS NOTES
every 8 hours as needed. , Disp: , Rfl:     amphetamine-dextroamphetamine (ADDERALL, 10MG,) 10 MG tablet, Take 10 mg by mouth daily. , Disp: , Rfl:   Allergies   Allergen Reactions    Iv Dye [Iodides] Anaphylaxis     Iodine dye     Social History     Socioeconomic History    Marital status:      Spouse name: Not on file    Number of children: Not on file    Years of education: Not on file    Highest education level: Not on file   Occupational History    Not on file   Tobacco Use    Smoking status: Former Smoker     Years: 10.00     Types: Cigarettes    Smokeless tobacco: Never Used   Vaping Use    Vaping Use: Never used   Substance and Sexual Activity    Alcohol use: Never    Drug use: Never    Sexual activity: Yes     Partners: Female   Other Topics Concern    Not on file   Social History Narrative    Not on file     Social Determinants of Health     Financial Resource Strain:     Difficulty of Paying Living Expenses: Not on file   Food Insecurity:     Worried About [employer] [employer] in the Last Year: Not on file    [address] in the Last Year: Not on file   Transportation Needs:     Lack of Transportation (Medical): Not on file    Lack of Transportation (Non-Medical):  Not on file   Physical Activity:     Days of Exercise per Week: Not on file    Minutes of Exercise per Session: Not on file   Stress:     Feeling of Stress : Not on file   Social Connections:     Frequency of Communication with Friends and Family: Not on file    Frequency of Social Gatherings with Friends and Family: Not on file    Attends Tenriism Services: Not on file    Active Member of Clubs or Organizations: Not on file    Attends Club or Organization Meetings: Not on file    Marital Status: Not on file   Intimate Partner Violence:     Fear of Current or Ex-Partner: Not on file    Emotionally Abused: Not on file    Physically Abused: Not on file    Sexually Abused: Not on file   Housing Stability:  Unable to Pay for Housing in the Last Year: Not on file    Number of Places Lived in the Last Year: Not on file    Unstable Housing in the Last Year: Not on file     Family History   Problem Relation Age of Onset    No Known Problems Mother     Heart Failure Father        REVIEW OF SYSTEMS:     General/Constitution:  (-)weight loss, (-)fever, (-)chills, (-)weakness. Skin: (-) rash,(-) psoriasis,(-) eczema, (-)skin cancer. Musculoskeletal: (-) fractures,  (-) dislocations,(-) collagen vascular disease, (-) fibromyalgia, (-) multiple sclerosis, (-) muscular dystrophy, (-) RSD,(-) joint pain (-)swelling, (-) joint pain,swelling. Neurologic: (-) epilepsy, (-)seizures,(-) brain tumor,(-) TIA, (-)stroke, (-)headaches, (-)Parkinson disease,(-) memory loss, (-) LOC. Cardiovascular: (-) Chest pain, (-) swelling in legs/feet, (-) SOB, (-) cramping in legs/feet with walking. Respiratory: (-) SOB, (-) Coughing, (-) night sweats. GI: (-) nausea, (-) vomiting, (-) diarrhea, (-) blood in stool, (-) gastric ulcer. Psychiatric: (-) Depression, (-) Anxiety, (-) bipolar disease, (-) Alzheimer's Disease  Allergic/Immunologic: (-) allergies latex, (-) allergies metal, (-) skin sensitivity. Hematlogic: (-) anemia, (-) blood transfusion, (-) DVT/PE, (-) Clotting disorders    SUBJECTIVE:    Constitution:  The patient is alert and oriented x 3, appears to be stated age and in no distress. Temp 98 °F (36.7 °C)   Ht 5' 8\" (1.727 m)   Wt 165 lb (74.8 kg)   BMI 25.09 kg/m²       Skin:  Upon inspection: the skin appears warm, dry and intact. There is not a previous scar over the affected area. There is not any cellulitis, lymphedema or cutaneous lesions noted in the lower extremities. Upon palpation there is no induration noted. Neurologic:  Gait: normal;  Motor exam of the upper extremities show: The reflexes in biceps/triceps/brachioradialis are equal and symmetric. Sensory exam C5-T1 are normal bilaterally. Cardiovascular: The vascular exam is normal and is well perfused to distal extremities. There are 2+ radial pulses bilaterally, and motor and sensation is intact to median, ulnar, and radial, musclocutaneus, and axillary nerve distribution and grossly symmetric bilaterally. There is cap refill noted less than two seconds in all digits. There is not edema of the bilateral upper extremities. There is not varicosities noted in the distal extremities. Lymph:  Upon palpation,  there is no lymphadenopathy noted in bilateral upper extremities. Musculoskeletal:  Gait: normal; examination of the nails and digits reveal no cyanosis or clubbing. Cervical Exam:  On physical exam, [facility] is well-developed, well-nourished, oriented to person, place and time. his gait is normal.  On evaluation of his cervical spine, he has full range of motion of the cervical spine without pain. There is no cervical tenderness to palpation. Shoulder Exam:   On evaluation of his bilaterally upper extremities, his bilateral shoulder has no deformity. There is tenderness upon palpation of the anterior shoulder and clavicle. There is not evidence of scapular dyskinesis. There is not muscle atrophy in shoulder girdle. The range of motion for the Right Shoulder is 140/40/BL and for the Left shoulder is 140/40/BL. Right shoulder Motor strength is 5/5 in the supraspinatus, 5/5 internal rotation and 5/5 in external rotation, and Left shoulder motor strength 5/5 in supraspinatus, 5/5 in internal rotation, 5/5 in external rotation.         Right shoulder:  negative Impingement , negative Barcenas ,negative  Speeds,negative  Apprehension ,negative Faustin Load Shift, negative Tessie manuver, negative Cross arm test.     Left shoulder:  negative Impingement , negative Barcenas ,negative  Speeds,negative  Apprehension ,negative Faustin Load Shift, negative Tessie manuver, negative Cross arm test.     X-ray:  Not performed    MRI:  Not performed. Radiographic findings reviewed with patient        Impression:       Encounter Diagnoses   Name Primary?  Traumatic rupture of supraspinatus tendon of right shoulder, subsequent encounter Yes    Traumatic rupture of supraspinatus tendon of left shoulder, subsequent encounter        Plan:  Natural history and expected course discussed. Questions answered. Educational material distributed. I do not believe his pain is coming from his shoulders. He is having an inflammatory response for some reason. I will order a medrol dose pack and Celebrex. If he does not improve, I would suggest he see some one in thoracic or general med as this is not an orthopedic issue.

## 2022-03-31 ENCOUNTER — TELEPHONE (OUTPATIENT)
Dept: ORTHOPEDIC SURGERY | Age: 57
End: 2022-03-31

## 2022-03-31 DIAGNOSIS — S32.312S: Primary | ICD-10-CM

## 2022-04-19 ENCOUNTER — TELEPHONE (OUTPATIENT)
Dept: ADMINISTRATIVE | Age: 57
End: 2022-04-19

## 2022-04-19 NOTE — TELEPHONE ENCOUNTER
Pt called to r/s the OV that he missed yesterday, with Dr Areli Urban.   Appt was for: [facility]   20 week (requesting JVG) check for LT Ilium, Removal of HO vs Revision of Fx Frag, DOS 11-17-21 by JVG;GP is 2-15-22

## 2022-04-20 NOTE — TELEPHONE ENCOUNTER
Patient returning call to reschedule NS 4/7 appointment, requesting to speak with office staff. Unable to reach office via teams.

## 2022-04-20 NOTE — TELEPHONE ENCOUNTER
Received callback from patient. Patient verbalized frustration stating that he has tried to call office six times to make an appointment. Return call placed at this time. No answer, call went directly to voicemail without ringing. Left message to call office and provided direct number. Appointment tentatively scheduled, pending patient confirmation.      Future Appointments   Date Time Provider Maria Guadalupe Bernal   5/19/2022  1:15 PM Radha Victor DO [facility]

## 2022-04-20 NOTE — TELEPHONE ENCOUNTER
Patient returned call to office. Return call placed to patient. Patient okay with appointment date of 5/19/2022. Patient did request an early appointment, appointment rescheduled to 7:45am. Patient verbalized understanding.     Future Appointments   Date Time Provider Maria Guadalupe Bernal   5/19/2022  7:45 AM Rupinder Elias DO [facility]

## 2022-04-20 NOTE — TELEPHONE ENCOUNTER
After chart review, patient's last office visit was scheduled 4/7/2022 and patient was [facility]" for this appointment. Call placed to patient at this time. No answer, left voicemail to call office back to reschedule appointment.

## 2022-05-19 ENCOUNTER — HOSPITAL ENCOUNTER (OUTPATIENT)
Dept: GENERAL RADIOLOGY | Age: 57
Discharge: HOME | End: 2022-05-21
Payer: COMMERCIAL

## 2022-05-19 ENCOUNTER — OFFICE VISIT (OUTPATIENT)
Dept: ORTHOPEDIC SURGERY | Age: 57
End: 2022-05-19
Payer: COMMERCIAL

## 2022-05-19 DIAGNOSIS — S32.312D: Primary | ICD-10-CM

## 2022-05-19 DIAGNOSIS — S32.312S: ICD-10-CM

## 2022-05-19 DIAGNOSIS — M54.16 LUMBAR BACK PAIN WITH RADICULOPATHY AFFECTING LEFT LOWER EXTREMITY: ICD-10-CM

## 2022-05-19 DIAGNOSIS — M23.92 ACUTE INTERNAL DERANGEMENT OF LEFT KNEE: ICD-10-CM

## 2022-05-19 PROCEDURE — 99213 OFFICE O/P EST LOW 20 MIN: CPT | Performed by: ORTHOPAEDIC SURGERY

## 2022-05-19 PROCEDURE — 99212 OFFICE O/P EST SF 10 MIN: CPT | Performed by: ORTHOPAEDIC SURGERY

## 2022-05-19 PROCEDURE — 72190 X-RAY EXAM OF PELVIS: CPT

## 2022-05-19 PROCEDURE — 99212 OFFICE O/P EST SF 10 MIN: CPT

## 2022-05-19 RX ORDER — GABAPENTIN 100 MG/1
100 CAPSULE ORAL 3 TIMES DAILY
Qty: 90 CAPSULE | Refills: 2 | Status: SHIPPED | OUTPATIENT
Start: 2022-05-19 | End: 2022-08-17

## 2022-05-19 NOTE — LETTER
[address]  [address]  [address] [facility] [address]  Phone: [phone]  Fax: [phone]        May 19, 2022     Patient: Fabiano Search   YOB: 1965   Date of Visit: 5/19/2022       To Whom It May Concern: It is my medical opinion that Gregorio Mortensen may return to full duty immediately with no restrictions. If you have any questions or concerns, please don't hesitate to call.     Sincerely,        [facility] [employer], DO

## 2022-05-24 NOTE — PROGRESS NOTES
Chief Complaint   Patient presents with    Follow-up     follow up left ilium, removal of ho vs revision of fx fragment DOS 11/17/2021, pt states he is doing a little better still have pain constantly that does not go away, denies numbness, tingling or burning, would like a prescription for a new heating pad which helps with the pain, takes oxycodone for another issues which helps with his pain. OP:SURGEON: Dr. Alexa Henderson,   DATE OF PROCEDURE: 11-17-21  PROCEDURE: Open treatment left ilium avulsion fracture with fragment excision along with excision heterotopic ossification       Subjective:  Jose Pacheco is following up from the above surgery. He is WBAT on left lower extremity. He ambulates with no assistive device. Patient is back to work, he has been on modified duty light restriction although states he has been doing his normal job pretty much and would like to transition back to full duty without restriction. States he still has some pain which he describes on the inner thigh, denies any groin pain or anterior lateral hip pain. He is full weightbearing without assistive device. States has been using a heating pad which helps with his symptoms. Denies any low back pain. Denies any numbness or tingling of the affected extremity. No other issues or concerns today. Review of Systems -  All pertinent positives/negative in HPI     Objective:    General: Alert and oriented X 3, normocephalic atraumatic, external ears and eye normal, sclera clear, no acute distress, respirations easy and unlabored with no audible wheezes, skin warm and dry, speech and dress appropriate for noted age, affect euthymic.     Extremity:  Left Lower Extremity  Skin clean dry and intact, without signs of infection   Incision well-healed  no edema noted  Compartments supple throughout thigh and leg  Calf supple and not tender  negative Homans  Demonstrates active motion with hip flexion and abduction, knee flexion/extension ankle dorsi/plantar flexion. Ambulates with no assistive devices. 5/5 hip f/e/abd/add without pain against resistance  States sensation intact to touch in sural, deep peroneal, superficial peroneal, saphenous, posterior tibial  nerve distributions to foot/ankle. Palpable dorsalis pedis and posterior tibialis pulses, cap refill brisk in toes, foot warm/perfused. No pain with passive internal and external rotation of his hip. Minimal TTP ASIS, sensation intact LFC    There were no vitals taken for this visit. XR:   Pelvis films demonstrate stable chronic appearing contour irregularity of the lateral left iliac wing. No acute fracture or dislocation. Moderate bilateral hip OA noted. Assessment:   Diagnosis Orders   1. Closed displaced avulsion fracture of left ilium with routine healing, subsequent encounter     2. Acute internal derangement of left knee     3. Lumbar back pain with radiculopathy affecting left lower extremity       Plan:  X-rays reviewed and discussed. Continue weightbearing as tolerated left lower extremity. Patient was given a form to return to work without restrictions   Rx: heating pad  Can f/u PRN  Call if any questions or concerns    [address] [place] [address] Patient Plan Of Care  New Orders Needing C-9 Authorization: None at this appointment  [employer]-14/Work Status: Plan for patient to return to work without restrictions   Patient Progressing: Progressing as expected  Patient candidate for Vocational Rehab at this time: No  Patient determined to be MMI at this visit: No    Electronically signed by Aliya Paredes DO on 5/19/22    Note: This report was completed using [employer] voiced recognition software. Every effort has been made to ensure accuracy; however, inadvertent computerized transcription errors may be present.

## 2022-08-13 NOTE — PROGRESS NOTES
Chief Complaint   Patient presents with    Post-Op Check     Left ilium, removal of hardware 11/17/2021       OP:SURGEON: Dr. Gabriel Dockery,   DATE OF PROCEDURE: 11/17/21  PROCEDURE:Open treatment left ilium avulsion fracture with fragment excision along with excision heterotopic ossification       Subjective:  Arlon Shashiin Core is approximately 6 weeks for the above date of surgery. He is weightbearing towards the level extremity without using any assistive devices. He is not currently in physical therapy. This is a [facility] injury he has not returned to work and states that there is no light duty for him to do. States that he feel fully ready to return to work. He is taking ibuprofen along with chronic opioid medication as prescribed by pain management. States that he does have a rating pain proximally through his left flank up to his left axilla but denies paresthesias. Denies any groin pain or saddle paresthesias or issues with bowel or bladder. Denies calf pain, CP, SOB, fever, chills, malaise, myalgias. Review of Systems -  all pertinent positives and negatives in HPI. Objective:    General: Alert and oriented X 3, normocephalic atraumatic, external ears and eye normal, sclera clear, no acute distress, respirations easy and unlabored with no audible wheezes, skin warm and dry, speech and dress appropriate for noted age, affect euthymic. Extremity:  Left Lower Extremity  Skin clean dry and intact, without signs of infection  Incision well healed  Mild edema noted about incision line with palpable scar tissue, minimal TTP  nontender to passive IR and ER, ER to 80, IR 40 at the L hip   Compartments supple throughout thigh and leg  Calf supple and nontender  Demonstrates active knee flexion/extension, ankle plantar/dorsiflexion/great toe extension. States sensation intact to touch in sural/deep peroneal/superficial peroneal/saphenous/posterior tibial nerve distributions to foot/ankle.    Palpable dorsalis pedis and posterior tibialis pulses, cap refill brisk in toes, foot warm/perfused. Temp 97.2 °F (36.2 °C) (Oral)     XR:   3 views of pelvis demonstrating s/p HO removal. No significant change in alignment. No acute fractures or dislocations or any other osseus abnormality identified. No significant interval HO formation. Assessment:   Diagnosis Orders   1. Closed displaced avulsion fracture of left ilium, sequela  XR PELVIS (MIN 3 VIEWS)       Plan:   Reviewed x-rays with patient today in office    WBAT L LE    Continue remaining active, working on ROM and strengthening at home    Will continue to keep off work until further strengthening and ROM and will discuss RTW at next OV    Ibuprofen refilled, can alternate heat and ice PRN. Discussed pain expectations given hx of high dose opioid use   Continue f/u with PM      [facility] Patient Plan Of Care  New Orders Needing C-9 Authorization: None at this appointment  [employer]/Work Status: Patient not released to return to work at this time and will be reevaluated at next office visit  Patient Progressing: Progressing as expected  Patient candidate for Vocational Rehab at this time: No  Patient determined to be [address] [place] at this visit: No      Follow up in 6 weeks with XR of the pelvis    Electronically signed by Mahendra Rendon PA-C on 12/28/2021 at 12:56 PM  Note: This report was completed using [employer] voiced recognition software. Every effort has been made to ensure accuracy; however, inadvertent computerized transcription errors may be present. English

## 2022-09-20 ENCOUNTER — OFFICE VISIT (OUTPATIENT)
Dept: ORTHOPEDIC SURGERY | Age: 57
End: 2022-09-20
Payer: COMMERCIAL

## 2022-09-20 VITALS — TEMPERATURE: 98 F | WEIGHT: 165 LBS | BODY MASS INDEX: 25.01 KG/M2 | HEIGHT: 68 IN

## 2022-09-20 DIAGNOSIS — S46.811D TRAUMATIC RUPTURE OF SUPRASPINATUS TENDON OF RIGHT SHOULDER, SUBSEQUENT ENCOUNTER: Primary | ICD-10-CM

## 2022-09-20 PROCEDURE — 99213 OFFICE O/P EST LOW 20 MIN: CPT | Performed by: ORTHOPAEDIC SURGERY

## 2022-09-20 NOTE — PROGRESS NOTES
Chief Complaint   Patient presents with    Shoulder Pain     B/L shoulder pain can't put any weight on his hands. Trouble picking things up. Chief Complaint   Patient presents with    Shoulder Pain     B/L shoulder pain can't put any weight on his hands. Trouble picking things up. Andrew Michael returns today for follow up of his bilateral shoulder pain, workers comp injury. He is having difficulty picking items up. Past Medical History:   Diagnosis Date    ADHD     Kidney stones     Work related injury 08/2020    BILAT torn roatator cuffs ,chest contusion      Past Surgical History:   Procedure Laterality Date    COLONOSCOPY      FRACTURE SURGERY      FOOT   age 15  caught his foot in [address] [place] [address] Ave Bilateral     arthroscopy    PELVIC FRACTURE SURGERY Left 11/17/2021    LEFT ILLIUM REMOVAL OF HETEROTOPIC OSSIFICATION performed by Melvina Rowley DO at [address] [facility] [place] ARTHROSCOPY Right 12/04/2020    rotator cuff repair, subacromial decompression and debridement    SHOULDER ARTHROSCOPY Right 12/04/2020    RIGHT SHOULDER ARTHROSCOPY, ROTATOR CUFF REPAIR, SUBACROMIAL DECOMPRESSION (CPT 45411) performed by Kevin Guaman DO at [address] ARTHROSCOPY Left 03/19/2021    LEFT SHOULDER ARTHROSCOPY SAD DEBRIDEMENT     SHOULDER ARTHROSCOPY Left 03/19/2021    LEFT SHOULDER ARTHROSCOPY SAD AND DEBRIDEMENT     SHOULDER ARTHROSCOPY Left 3/19/2021    LEFT SHOULDER ARTHROSCOPY, SUBACROMIAL DECOMPRESSION,  ROTATOR CUFF REPAIR AND DEBRIDEMENT (CPT 52262) (ARTHREX) performed by Kevin Guaman DO at [facility]       Current Outpatient Medications:     ibuprofen (ADVIL;MOTRIN) 800 MG tablet, Take 1 tablet by mouth 2 times daily as needed for Pain, Disp: 60 tablet, Rfl: 0    oxyCODONE (ROXICODONE) 20 MG immediate release tablet, Take 20 mg by mouth every 8 hours as needed. , Disp: , Rfl:     amphetamine-dextroamphetamine (ADDERALL) 10 MG tablet, Take 10 mg by mouth daily. , Disp: , Rfl:     Misc. Devices MISC, 1 each by Does not apply route once for 1 dose Heating pad, Disp: 1 each, Rfl: 0    gabapentin (NEURONTIN) 100 MG capsule, Take 1 capsule by mouth 3 times daily for 90 days. , Disp: 90 capsule, Rfl: 2    celecoxib (CELEBREX) 200 MG capsule, Take 1 capsule by mouth daily, Disp: 30 capsule, Rfl: 3  Allergies   Allergen Reactions    Iv Dye [Iodides] Anaphylaxis     Iodine dye     Social History     Socioeconomic History    Marital status:      Spouse name: Not on file    Number of children: Not on file    Years of education: Not on file    Highest education level: Not on file   Occupational History    Not on file   Tobacco Use    Smoking status: Former     Years: 10.00     Types: Cigarettes    Smokeless tobacco: Never   Vaping Use    Vaping Use: Never used   Substance and Sexual Activity    Alcohol use: Never    Drug use: Never    Sexual activity: Yes     Partners: Female   Other Topics Concern    Not on file   Social History Narrative    Not on file     Social Determinants of Health     Financial Resource Strain: Not on file   Food Insecurity: Not on file   Transportation Needs: Not on file   Physical Activity: Not on file   Stress: Not on file   Social Connections: Not on file   Intimate Partner Violence: Not on file   Housing Stability: Not on file     Family History   Problem Relation Age of Onset    No Known Problems Mother     Heart Failure Father        REVIEW OF SYSTEMS:     General/Constitution:  (-)weight loss, (-)fever, (-)chills, (-)weakness. Skin: (-) rash,(-) psoriasis,(-) eczema, (-)skin cancer. Musculoskeletal: (-) fractures,  (-) dislocations,(-) collagen vascular disease, (-) fibromyalgia, (-) multiple sclerosis, (-) muscular dystrophy, (-) RSD,(-) joint pain (-)swelling, (-) joint pain,swelling. Neurologic: (-) epilepsy, (-)seizures,(-) brain tumor,(-) TIA, (-)stroke, (-)headaches, (-)Parkinson disease,(-) memory loss, (-) LOC.   Cardiovascular: (-) Chest pain, (-) swelling in legs/feet, (-) SOB, (-) cramping in legs/feet with walking. Respiratory: (-) SOB, (-) Coughing, (-) night sweats. GI: (-) nausea, (-) vomiting, (-) diarrhea, (-) blood in stool, (-) gastric ulcer. Psychiatric: (-) Depression, (-) Anxiety, (-) bipolar disease, (-) Alzheimer's Disease  Allergic/Immunologic: (-) allergies latex, (-) allergies metal, (-) skin sensitivity. Hematlogic: (-) anemia, (-) blood transfusion, (-) DVT/PE, (-) Clotting disorders    SUBJECTIVE:    Constitution:  The patient is alert and oriented x 3, appears to be stated age and in no distress. Temp 98 °F (36.7 °C)   Ht 5' 8\" (1.727 m)   Wt 165 lb (74.8 kg)   BMI 25.09 kg/m²       Skin:  Upon inspection: the skin appears warm, dry and intact. There is not a previous scar over the affected area. There is not any cellulitis, lymphedema or cutaneous lesions noted in the lower extremities. Upon palpation there is no induration noted. Neurologic:  Gait: normal;  Motor exam of the upper extremities show: The reflexes in biceps/triceps/brachioradialis are equal and symmetric. Sensory exam C5-T1 are normal bilaterally. Cardiovascular: The vascular exam is normal and is well perfused to distal extremities. There are 2+ radial pulses bilaterally, and motor and sensation is intact to median, ulnar, and radial, musclocutaneus, and axillary nerve distribution and grossly symmetric bilaterally. There is cap refill noted less than two seconds in all digits. There is not edema of the bilateral upper extremities. There is not varicosities noted in the distal extremities. Lymph:  Upon palpation,  there is no lymphadenopathy noted in bilateral upper extremities. Musculoskeletal:  Gait: normal; examination of the nails and digits reveal no cyanosis or clubbing. Cervical Exam:  On physical exam, Zenia Pacheco is well-developed, well-nourished, oriented to person, place and time.   his gait is normal.  On evaluation of his cervical spine, he has full range of motion of the cervical spine without pain. There is no cervical tenderness to palpation. Shoulder Exam:   On evaluation of his bilaterally upper extremities, his bilateral shoulder has no deformity. There is tenderness upon palpation of the anterior shoulder and clavicle. There is not evidence of scapular dyskinesis. There is not muscle atrophy in shoulder girdle. The range of motion for the Right Shoulder is 140/40/BL and for the Left shoulder is 140/40/BL. Right shoulder Motor strength is 5/5 in the supraspinatus, 5/5 internal rotation and 5/5 in external rotation, and Left shoulder motor strength 5/5 in supraspinatus, 5/5 in internal rotation, 5/5 in external rotation. Right shoulder:  negative Impingement , negative Barcenas ,negative  Speeds,negative  Apprehension ,negative Faustin Load Shift, negative Tessie manuver, negative Cross arm test.     Left shoulder:  negative Impingement , negative Barcenas ,negative  Speeds,negative  Apprehension ,negative Faustin Load Shift, negative Tessie manuver, negative Cross arm test.     X-ray:  Not performed    MRI:  Not performed. Radiographic findings reviewed with patient        Impression:       Encounter Diagnosis   Name Primary? Traumatic rupture of supraspinatus tendon of right shoulder, subsequent encounter Yes         Plan:  Natural history and expected course discussed. Questions answered. Educational material distributed.     C9 EMG ulnar neuropathy lateral arm medial forearm palm and ulnar 2 fingers

## 2023-01-06 ENCOUNTER — OFFICE VISIT (OUTPATIENT)
Dept: ORTHOPEDIC SURGERY | Age: 58
End: 2023-01-06
Payer: COMMERCIAL

## 2023-01-06 VITALS — BODY MASS INDEX: 25.01 KG/M2 | TEMPERATURE: 98 F | WEIGHT: 165 LBS | HEIGHT: 68 IN

## 2023-01-06 DIAGNOSIS — S46.811D TRAUMATIC RUPTURE OF SUPRASPINATUS TENDON OF RIGHT SHOULDER, SUBSEQUENT ENCOUNTER: Primary | ICD-10-CM

## 2023-01-06 PROCEDURE — 99213 OFFICE O/P EST LOW 20 MIN: CPT | Performed by: NURSE PRACTITIONER

## 2023-01-06 NOTE — PROGRESS NOTES
Chief Complaint   Patient presents with    Shoulder Pain     Bilateral Shoulder F/U, did not get EMG done yet. [facility]       Alice Ledesma returns today for follow up of his bilateral shoulder pain, workers comp injury. He is having difficulty picking items up. Past Medical History:   Diagnosis Date    ADHD     Kidney stones     Work related injury 08/2020    BILAT torn roatator cuffs ,chest contusion      Past Surgical History:   Procedure Laterality Date    COLONOSCOPY      FRACTURE SURGERY      FOOT   age 15  caught his foot in [address] [place] [address] Ave Bilateral     arthroscopy    PELVIC FRACTURE SURGERY Left 11/17/2021    LEFT ILLIUM REMOVAL OF HETEROTOPIC OSSIFICATION performed by Adeline Araiza DO at [address] [address] ARTHROSCOPY Right 12/04/2020    rotator cuff repair, subacromial decompression and debridement    SHOULDER ARTHROSCOPY Right 12/04/2020    RIGHT SHOULDER ARTHROSCOPY, ROTATOR CUFF REPAIR, SUBACROMIAL DECOMPRESSION (CPT 74104) performed by Ros Oro DO at [address] ARTHROSCOPY Left 03/19/2021    LEFT SHOULDER ARTHROSCOPY SAD DEBRIDEMENT     SHOULDER ARTHROSCOPY Left 03/19/2021    LEFT SHOULDER ARTHROSCOPY SAD AND DEBRIDEMENT     SHOULDER ARTHROSCOPY Left 3/19/2021    LEFT SHOULDER ARTHROSCOPY, SUBACROMIAL DECOMPRESSION,  ROTATOR CUFF REPAIR AND DEBRIDEMENT (CPT 38364) (ARTHREX) performed by Ros Oro DO at [facility]       Current Outpatient Medications:     ibuprofen (ADVIL;MOTRIN) 800 MG tablet, Take 1 tablet by mouth 2 times daily as needed for Pain, Disp: 60 tablet, Rfl: 0    oxyCODONE (ROXICODONE) 20 MG immediate release tablet, Take 20 mg by mouth every 8 hours as needed. , Disp: , Rfl:     amphetamine-dextroamphetamine (ADDERALL) 10 MG tablet, Take 10 mg by mouth daily. , Disp: , Rfl:     Misc.  Devices MISC, 1 each by Does not apply route once for 1 dose Heating pad, Disp: 1 each, Rfl: 0    gabapentin (NEURONTIN) 100 MG capsule, Take 1 capsule by mouth 3 times daily for 90 days. , Disp: 90 capsule, Rfl: 2    celecoxib (CELEBREX) 200 MG capsule, Take 1 capsule by mouth daily, Disp: 30 capsule, Rfl: 3  Allergies   Allergen Reactions    Iv Dye [Iodides] Anaphylaxis     Iodine dye     Social History     Socioeconomic History    Marital status:      Spouse name: Not on file    Number of children: Not on file    Years of education: Not on file    Highest education level: Not on file   Occupational History    Not on file   Tobacco Use    Smoking status: Former     Years: 10.00     Types: Cigarettes    Smokeless tobacco: Never   Vaping Use    Vaping Use: Never used   Substance and Sexual Activity    Alcohol use: Never    Drug use: Never    Sexual activity: Yes     Partners: Female   Other Topics Concern    Not on file   Social History Narrative    Not on file     Social Determinants of Health     Financial Resource Strain: Not on file   Food Insecurity: Not on file   Transportation Needs: Not on file   Physical Activity: Not on file   Stress: Not on file   Social Connections: Not on file   Intimate Partner Violence: Not on file   Housing Stability: Not on file     Family History   Problem Relation Age of Onset    No Known Problems Mother     Heart Failure Father        REVIEW OF SYSTEMS:     General/Constitution:  (-)weight loss, (-)fever, (-)chills, (-)weakness. Skin: (-) rash,(-) psoriasis,(-) eczema, (-)skin cancer. Musculoskeletal: (-) fractures,  (-) dislocations,(-) collagen vascular disease, (-) fibromyalgia, (-) multiple sclerosis, (-) muscular dystrophy, (-) RSD,(-) joint pain (-)swelling, (-) joint pain,swelling. Neurologic: (-) epilepsy, (-)seizures,(-) brain tumor,(-) TIA, (-)stroke, (-)headaches, (-)Parkinson disease,(-) memory loss, (-) LOC. Cardiovascular: (-) Chest pain, (-) swelling in legs/feet, (-) SOB, (-) cramping in legs/feet with walking. Respiratory: (-) SOB, (-) Coughing, (-) night sweats.   GI: (-) nausea, (-) vomiting, (-) diarrhea, (-) blood in stool, (-) gastric ulcer. Psychiatric: (-) Depression, (-) Anxiety, (-) bipolar disease, (-) Alzheimer's Disease  Allergic/Immunologic: (-) allergies latex, (-) allergies metal, (-) skin sensitivity. Hematlogic: (-) anemia, (-) blood transfusion, (-) DVT/PE, (-) Clotting disorders    SUBJECTIVE:    Constitution:  The patient is alert and oriented x 3, appears to be stated age and in no distress. Temp 98 °F (36.7 °C)   Ht 5' 8\" (1.727 m)   Wt 165 lb (74.8 kg)   BMI 25.09 kg/m²       Skin:  Upon inspection: the skin appears warm, dry and intact. There is not a previous scar over the affected area. There is not any cellulitis, lymphedema or cutaneous lesions noted in the lower extremities. Upon palpation there is no induration noted. Neurologic:  Gait: normal;  Motor exam of the upper extremities show: The reflexes in biceps/triceps/brachioradialis are equal and symmetric. Sensory exam C5-T1 are normal bilaterally. Cardiovascular: The vascular exam is normal and is well perfused to distal extremities. There are 2+ radial pulses bilaterally, and motor and sensation is intact to median, and radial, musclocutaneus, and axillary nerve distribution and grossly symmetric bilaterally. There is cap refill noted less than two seconds in all digits. There is not edema of the bilateral upper extremities. There is not varicosities noted in the distal extremities. + ulnar nerve sensation and motor function altered     Lymph:  Upon palpation,  there is no lymphadenopathy noted in bilateral upper extremities. Musculoskeletal:  Gait: normal; examination of the nails and digits reveal no cyanosis or clubbing. Cervical Exam:  On physical exam, Keri Pacheco is well-developed, well-nourished, oriented to person, place and time. his gait is normal.  On evaluation of his cervical spine, he has full range of motion of the cervical spine without pain.   There is no cervical tenderness to palpation. Shoulder Exam:   On evaluation of his bilaterally upper extremities, his bilateral shoulder has no deformity. There is tenderness upon palpation of the anterior shoulder and clavicle. There is not evidence of scapular dyskinesis. There is not muscle atrophy in shoulder girdle. The range of motion for the Right Shoulder is 140/40/BL and for the Left shoulder is 140/40/BL. Right shoulder Motor strength is 5/5 in the supraspinatus, 5/5 internal rotation and 5/5 in external rotation, and Left shoulder motor strength 5/5 in supraspinatus, 5/5 in internal rotation, 5/5 in external rotation. Right shoulder:  negative Impingement , negative Barcenas ,negative  Speeds,negative  Apprehension ,negative Faustin Load Shift, negative Tessie manuver, negative Cross arm test.     Left shoulder:  negative Impingement , negative Barcenas ,negative  Speeds,negative  Apprehension ,negative Faustin Load Shift, negative Tessie manuver, negative Cross arm test.     Elbow exam:  Evaluation of the elbow, reveals no signs of swelling or deformity. ROM is 0-140. There is not instability with varus/valgus stresses. Motor strength is 5/5 with flexion/extension. Wrist exam:  Inspection of the bilateral upper extremities, there is no evidence of deformity of the wrist.  ROM Wrist ROM R wrist DF 90, VF 90, L wrist DF 90, VF 90, R pronation 90/ supination 90, L pronation 90/supination 90. Motor strength is 5/5 with Dorsiflexion/Volarflexion/Supination/Pronation. Motor and sensation is intact and symmetric throughout the bilateral upper extremities in the median, ulnar and radial , musclcutaneous, and axillary nerve distributions. Hand exam:  The skin overlying the hand is  intact. There is not evidence of scar, lesion, laceration, or abrasion. The motion in the small joints of the hand are intact with no stiffness or deformity.   The ROM in the MCP flexion 90/ extension 0 , PIP flexion 90/ extension 0, DIP flexion 70/ extension 0. There is not rotational deformity. There is no masses or adenopathy in bilateral upper extremities. Radial pulses are 2+ and symmetric bilaterally. Capillary refill is intact and < 2 seconds. Motor strength is 5/5 with flexion and extension of the small finger joints. Right:  Phallens sign(-), Tinnells sign (-), Median nerve compression test (-),  Finklesteins (-), [employer] Grind test (-), [employer](-). Left:    Phallens sign(+), Tinnells sign (+), Median nerve compression test (-),  Finklesteins (-), [employer] Grind test (-), [employer](-). X-ray:  Not performed    MRI:  Not performed. Radiographic findings reviewed with patient        Impression:       Encounter Diagnosis   Name Primary? Traumatic rupture of supraspinatus tendon of right shoulder, subsequent encounter Yes         Plan:  Natural history and expected course discussed. Questions answered. Educational material distributed.     C9 EMG ulnar neuropathy lateral arm medial forearm palm and ulnar 2 fingers

## 2023-01-30 ENCOUNTER — OFFICE VISIT (OUTPATIENT)
Dept: ORTHOPEDIC SURGERY | Age: 58
End: 2023-01-30

## 2023-01-30 VITALS — WEIGHT: 165 LBS | HEIGHT: 68 IN | BODY MASS INDEX: 25.01 KG/M2 | TEMPERATURE: 98 F

## 2023-01-30 DIAGNOSIS — S46.811D TRAUMATIC RUPTURE OF SUPRASPINATUS TENDON OF RIGHT SHOULDER, SUBSEQUENT ENCOUNTER: Primary | ICD-10-CM

## 2023-01-30 PROCEDURE — 99213 OFFICE O/P EST LOW 20 MIN: CPT | Performed by: ORTHOPAEDIC SURGERY

## 2023-01-30 NOTE — PROGRESS NOTES
Chief Complaint   Patient presents with    Shoulder Pain     B/l shoulder pain  follow up WC. Right side is still the worse side. EMG was denied by . Still painful to use. Kasandra Sotelo returns today for follow up of his bilateral shoulder pain, workers comp injury. He is having difficulty picking items up. Emg was denied by [facility]. Past Medical History:   Diagnosis Date    ADHD     Kidney stones     Work related injury 08/2020    BILAT torn roatator cuffs ,chest contusion      Past Surgical History:   Procedure Laterality Date    COLONOSCOPY      FRACTURE SURGERY      FOOT   age 15  caught his foot in [address] [place] [address] Ave Bilateral     arthroscopy    PELVIC FRACTURE SURGERY Left 11/17/2021    LEFT ILLIUM REMOVAL OF HETEROTOPIC OSSIFICATION performed by To Braswell DO at [address] [address] [place] ARTHROSCOPY Right 12/04/2020    rotator cuff repair, subacromial decompression and debridement    SHOULDER ARTHROSCOPY Right 12/04/2020    RIGHT SHOULDER ARTHROSCOPY, ROTATOR CUFF REPAIR, SUBACROMIAL DECOMPRESSION (CPT 33668) performed by Andre Olivo DO at [address] ARTHROSCOPY Left 03/19/2021    LEFT SHOULDER ARTHROSCOPY SAD DEBRIDEMENT     SHOULDER ARTHROSCOPY Left 03/19/2021    LEFT SHOULDER ARTHROSCOPY SAD AND DEBRIDEMENT     SHOULDER ARTHROSCOPY Left 3/19/2021    LEFT SHOULDER ARTHROSCOPY, SUBACROMIAL DECOMPRESSION,  ROTATOR CUFF REPAIR AND DEBRIDEMENT (CPT 40869) (ARTHREX) performed by Andre Olivo DO at [facility]       Current Outpatient Medications:     Misc. Devices MISC, 1 each by Does not apply route once for 1 dose Heating pad, Disp: 1 each, Rfl: 0    gabapentin (NEURONTIN) 100 MG capsule, Take 1 capsule by mouth 3 times daily for 90 days. , Disp: 90 capsule, Rfl: 2    celecoxib (CELEBREX) 200 MG capsule, Take 1 capsule by mouth daily, Disp: 30 capsule, Rfl: 3    ibuprofen (ADVIL;MOTRIN) 800 MG tablet, Take 1 tablet by mouth 2 times daily as needed for Pain, Disp: 60 tablet, Rfl: 0    oxyCODONE (ROXICODONE) 20 MG immediate release tablet, Take 20 mg by mouth every 8 hours as needed. , Disp: , Rfl:     amphetamine-dextroamphetamine (ADDERALL) 10 MG tablet, Take 10 mg by mouth daily. , Disp: , Rfl:   Allergies   Allergen Reactions    Iv Dye [Iodides] Anaphylaxis     Iodine dye     Social History     Socioeconomic History    Marital status:      Spouse name: Not on file    Number of children: Not on file    Years of education: Not on file    Highest education level: Not on file   Occupational History    Not on file   Tobacco Use    Smoking status: Former     Years: 10.00     Types: Cigarettes    Smokeless tobacco: Never   Vaping Use    Vaping Use: Never used   Substance and Sexual Activity    Alcohol use: Never    Drug use: Never    Sexual activity: Yes     Partners: Female   Other Topics Concern    Not on file   Social History Narrative    Not on file     Social Determinants of Health     Financial Resource Strain: Not on file   Food Insecurity: Not on file   Transportation Needs: Not on file   Physical Activity: Not on file   Stress: Not on file   Social Connections: Not on file   Intimate Partner Violence: Not on file   Housing Stability: Not on file     Family History   Problem Relation Age of Onset    No Known Problems Mother     Heart Failure Father        REVIEW OF SYSTEMS:     General/Constitution:  (-)weight loss, (-)fever, (-)chills, (-)weakness. Skin: (-) rash,(-) psoriasis,(-) eczema, (-)skin cancer. Musculoskeletal: (-) fractures,  (-) dislocations,(-) collagen vascular disease, (-) fibromyalgia, (-) multiple sclerosis, (-) muscular dystrophy, (-) RSD,(-) joint pain (-)swelling, (-) joint pain,swelling. Neurologic: (-) epilepsy, (-)seizures,(-) brain tumor,(-) TIA, (-)stroke, (-)headaches, (-)Parkinson disease,(-) memory loss, (-) LOC.   Cardiovascular: (-) Chest pain, (-) swelling in legs/feet, (-) SOB, (-) cramping in legs/feet with walking. Respiratory: (-) SOB, (-) Coughing, (-) night sweats. GI: (-) nausea, (-) vomiting, (-) diarrhea, (-) blood in stool, (-) gastric ulcer. Psychiatric: (-) Depression, (-) Anxiety, (-) bipolar disease, (-) Alzheimer's Disease  Allergic/Immunologic: (-) allergies latex, (-) allergies metal, (-) skin sensitivity. Hematlogic: (-) anemia, (-) blood transfusion, (-) DVT/PE, (-) Clotting disorders    SUBJECTIVE:    Constitution:  The patient is alert and oriented x 3, appears to be stated age and in no distress. Temp 98 °F (36.7 °C)   Ht 5' 8\" (1.727 m)   Wt 165 lb (74.8 kg)   BMI 25.09 kg/m²       Skin:  Upon inspection: the skin appears warm, dry and intact. There is not a previous scar over the affected area. There is not any cellulitis, lymphedema or cutaneous lesions noted in the lower extremities. Upon palpation there is no induration noted. Neurologic:  Gait: normal;  Motor exam of the upper extremities show: The reflexes in biceps/triceps/brachioradialis are equal and symmetric. Sensory exam C5-T1 are normal bilaterally. Cardiovascular: The vascular exam is normal and is well perfused to distal extremities. There are 2+ radial pulses bilaterally, and motor and sensation is intact to median, and radial, musclocutaneus, and axillary nerve distribution and grossly symmetric bilaterally. There is cap refill noted less than two seconds in all digits. There is not edema of the bilateral upper extremities. There is not varicosities noted in the distal extremities. + ulnar nerve sensation and motor function altered     Lymph:  Upon palpation,  there is no lymphadenopathy noted in bilateral upper extremities. Musculoskeletal:  Gait: normal; examination of the nails and digits reveal no cyanosis or clubbing. Cervical Exam:  On physical exam, Wendy Pacheco is well-developed, well-nourished, oriented to person, place and time.   his gait is normal.  On evaluation of his cervical spine, he has full range of motion of the cervical spine without pain. There is no cervical tenderness to palpation. Shoulder Exam:   On evaluation of his bilaterally upper extremities, his bilateral shoulder has no deformity. There is tenderness upon palpation of the anterior shoulder and clavicle. There is not evidence of scapular dyskinesis. There is not muscle atrophy in shoulder girdle. The range of motion for the Right Shoulder is 140/40/BL and for the Left shoulder is 140/40/BL. Right shoulder Motor strength is 5/5 in the supraspinatus, 5/5 internal rotation and 5/5 in external rotation, and Left shoulder motor strength 5/5 in supraspinatus, 5/5 in internal rotation, 5/5 in external rotation. Right shoulder:  negative Impingement , negative Barcenas ,negative  Speeds,negative  Apprehension ,negative Faustin Load Shift, negative Tessie manuver, negative Cross arm test.     Left shoulder:  negative Impingement , negative Barcenas ,negative  Speeds,negative  Apprehension ,negative Faustin Load Shift, negative Tessie manuver, negative Cross arm test.     Elbow exam:  Evaluation of the elbow, reveals no signs of swelling or deformity. ROM is 0-140. There is not instability with varus/valgus stresses. Motor strength is 5/5 with flexion/extension. Wrist exam:  Inspection of the bilateral upper extremities, there is no evidence of deformity of the wrist.  ROM Wrist ROM R wrist DF 90, VF 90, L wrist DF 90, VF 90, R pronation 90/ supination 90, L pronation 90/supination 90. Motor strength is 5/5 with Dorsiflexion/Volarflexion/Supination/Pronation. Motor and sensation is intact and symmetric throughout the bilateral upper extremities in the median and radial , musclcutaneous, and axillary nerve distributions. Ulnar nerve distribution altered    Hand exam:  The skin overlying the hand is  intact. There is not evidence of scar, lesion, laceration, or abrasion.   The motion in the small joints of the hand are intact with no stiffness or deformity. The ROM in the MCP flexion 90/ extension 0 , PIP flexion 90/ extension 0, DIP flexion 70/ extension 0. There is not rotational deformity. There is no masses or adenopathy in bilateral upper extremities. Radial pulses are 2+ and symmetric bilaterally. Capillary refill is intact and < 2 seconds. Motor strength is 5/5 with flexion and extension of the small finger joints. Weakness in FDP little finger on right side    Right:  Phallens sign(-), Tinnells sign (-), Median nerve compression test (-),  Finklesteins (-), CMC Grind test (-), [employer](-). Left:    Phallens sign(+), Tinnells sign (+), Median nerve compression test (+),  Finklesteins (-), [employer] Grind test (-), [employer](-). X-ray:  Not performed    MRI:  Not performed. Radiographic findings reviewed with patient        Impression:       Encounter Diagnosis   Name Primary? Traumatic rupture of supraspinatus tendon of right shoulder, subsequent encounter Yes           Plan:  Natural history and expected course discussed. Questions answered. Educational material distributed.   [facility] denied EMG  I continue to recommend and EMG as patient is having difficulty picking items up , he has weakness and numbness and tingling

## 2023-02-24 ENCOUNTER — TELEPHONE (OUTPATIENT)
Dept: ORTHOPEDIC SURGERY | Age: 58
End: 2023-02-24

## 2023-02-24 NOTE — TELEPHONE ENCOUNTER
Pt lvm requesting appt for hip pain. Old injury 8/2020. Call back to pt no answer/vm not set up. Routing to provider for guidance.

## 2023-02-27 NOTE — TELEPHONE ENCOUNTER
Call to pt scheduled appt   Future Appointments   Date Time Provider Maria Guadalupe Bernal   3/30/2023  [address]

## 2023-03-09 ENCOUNTER — TELEPHONE (OUTPATIENT)
Dept: ADMINISTRATIVE | Age: 58
End: 2023-03-09

## 2023-03-30 DIAGNOSIS — S32.312D: Primary | ICD-10-CM

## 2023-05-04 ENCOUNTER — OFFICE VISIT (OUTPATIENT)
Dept: ORTHOPEDIC SURGERY | Age: 58
End: 2023-05-04
Payer: COMMERCIAL

## 2023-05-04 ENCOUNTER — HOSPITAL ENCOUNTER (OUTPATIENT)
Dept: GENERAL RADIOLOGY | Age: 58
Discharge: HOME | End: 2023-05-06
Payer: COMMERCIAL

## 2023-05-04 VITALS — WEIGHT: 165 LBS | HEIGHT: 68 IN | BODY MASS INDEX: 25.01 KG/M2

## 2023-05-04 DIAGNOSIS — S32.312D: ICD-10-CM

## 2023-05-04 DIAGNOSIS — S32.312S: ICD-10-CM

## 2023-05-04 DIAGNOSIS — M25.552 LEFT HIP PAIN: Primary | ICD-10-CM

## 2023-05-04 PROCEDURE — 99212 OFFICE O/P EST SF 10 MIN: CPT

## 2023-05-04 PROCEDURE — 72190 X-RAY EXAM OF PELVIS: CPT

## 2023-05-04 NOTE — PROGRESS NOTES
Chief Complaint   Patient presents with    Follow-up    Hip Pain     [facility] hip pain hx wc 8/12/20 Closed fx of iliac crest.  Hip pain has worsened in the last 6 months. Patient is unable to sleep. Walking with a cane . OP:SURGEON: Dr. Lenora Sol,   DATE OF PROCEDURE: 11-17-21  PROCEDURE: Open treatment left ilium avulsion fracture with fragment excision along with excision heterotopic ossification       Subjective:  Jt Pacheco is following up from the above surgery. He is WBAT on left lower extremity. He ambulates in with a cane today. States he was doing well however for the past 6 months has been having increasing pain to the left groin and anterior pelvic region. States this is can be severe at times and will often awaken him from night sleep. States he was starting to favor the right leg and felt he was having some symptoms of the right hip although more lateral hip than true groin region. States his pain is worse with activities on the left. He has done no formal treatment for this for the past 6 weeks. He is back at work but does have difficulty with certain activities. Denies any low back pain. Denies any numbness or tingling of the affected extremity. No other issues or concerns today. Review of Systems -  All pertinent positives/negative in HPI     Objective:    General: Alert and oriented X 3, normocephalic atraumatic, external ears and eye normal, sclera clear, no acute distress, respirations easy and unlabored with no audible wheezes, skin warm and dry, speech and dress appropriate for noted age, affect euthymic.     Extremity:  Left Lower Extremity  Surgical incision is well-healed, there is no erythema, no warmth no fluctuance or induration  There is nonspecific tenderness over the anterior pelvic region AIIS region  No pain with straight leg raise, able to passively logroll hip without any significant discomfort, 30 degrees internal rotation 45 external rotation,

## 2023-05-12 ENCOUNTER — TELEPHONE (OUTPATIENT)
Dept: ORTHOPEDIC SURGERY | Age: 58
End: 2023-05-12

## 2023-05-18 NOTE — TELEPHONE ENCOUNTER
Call placed to patient at this time. Spoke to patient regarding the denied C9. Patient states he will contact his  so that this can be appealed. Patient to contact office with any questions or concerns.

## 2023-07-06 DIAGNOSIS — M25.552 LEFT HIP PAIN: Primary | ICD-10-CM

## 2023-09-05 ENCOUNTER — HOSPITAL ENCOUNTER (OUTPATIENT)
Dept: GENERAL RADIOLOGY | Age: 58
Discharge: HOME | End: 2023-09-07

## 2023-09-05 ENCOUNTER — OFFICE VISIT (OUTPATIENT)
Dept: ORTHOPEDIC SURGERY | Age: 58
End: 2023-09-05

## 2023-09-05 VITALS — WEIGHT: 165 LBS | HEIGHT: 68 IN | BODY MASS INDEX: 25.01 KG/M2

## 2023-09-05 DIAGNOSIS — M25.552 LEFT HIP PAIN: ICD-10-CM

## 2023-09-05 DIAGNOSIS — M16.0 OSTEOARTHRITIS OF BOTH HIPS, UNSPECIFIED OSTEOARTHRITIS TYPE: ICD-10-CM

## 2023-09-05 DIAGNOSIS — M16.12 PRIMARY OSTEOARTHRITIS OF LEFT HIP: ICD-10-CM

## 2023-09-05 DIAGNOSIS — S32.312S: Primary | ICD-10-CM

## 2023-09-05 PROCEDURE — 72190 X-RAY EXAM OF PELVIS: CPT

## 2023-09-05 NOTE — PROGRESS NOTES
9:40 AM  Note: This report was completed using [employer] voiced recognition software. Every effort has been made to ensure accuracy; however, inadvertent computerized transcription errors may be present.

## 2023-09-05 NOTE — PATIENT INSTRUCTIONS
Patient will be set up for diagnostic and therapeutic left hip injection    Follow-up in 3 months for reevaluation. Call if any questions or concerns.

## 2023-09-12 ENCOUNTER — OFFICE VISIT (OUTPATIENT)
Dept: ORTHOPEDIC SURGERY | Age: 58
End: 2023-09-12

## 2023-09-12 VITALS — WEIGHT: 165 LBS | HEIGHT: 68 IN | BODY MASS INDEX: 25.01 KG/M2

## 2023-09-12 DIAGNOSIS — S32.312S: Primary | ICD-10-CM

## 2023-09-12 RX ORDER — TRIAMCINOLONE ACETONIDE 40 MG/ML
40 INJECTION, SUSPENSION INTRA-ARTICULAR; INTRAMUSCULAR ONCE
Status: COMPLETED | OUTPATIENT
Start: 2023-09-12 | End: 2023-09-12

## 2023-09-12 RX ORDER — LIDOCAINE HYDROCHLORIDE 10 MG/ML
5 INJECTION, SOLUTION INFILTRATION; PERINEURAL ONCE
Status: COMPLETED | OUTPATIENT
Start: 2023-09-12 | End: 2023-09-12

## 2023-09-12 RX ADMIN — TRIAMCINOLONE ACETONIDE 40 MG: 40 INJECTION, SUSPENSION INTRA-ARTICULAR; INTRAMUSCULAR at 11:11

## 2023-09-12 RX ADMIN — LIDOCAINE HYDROCHLORIDE 5 ML: 10 INJECTION, SOLUTION INFILTRATION; PERINEURAL at 11:11

## 2023-09-12 NOTE — PROGRESS NOTES
PROCEDURE NOTE:    DIAGNOSIS      LEFT hip pain in the setting of previous fracture    PROCEDURE     Diagnostic ultrasound-guided LEFT femoroacetabular?(hip) joint corticosteroid injection. PROCEDURAL PAUSE     Procedural pause conducted to verify: ?correct patient identity, procedure to be performed, and as applicable, correct side and site, correct patient position, and availability of implants, special equipment, or special requirements. PROCEDURE DETAILS     The procedure was carried out under sterile technique. Patient Position: ? Supine. Localization Process: ? The hip joint was evaluated under ultrasound prior to starting the procedure. ? The neurovascular bundle (femoral nerve, artery, vein) was identified under ultrasound prior to starting the procedure. ? The skin was prepped with Betadine and Alcohol. Approach: ? In-plane. Local Anesthesia: Under live ultrasound guidance, local anesthesia was obtained with vapocoolant cold spray and 2 cc of 1% lidocaine using a 25-gauge 2-inch needle advanced from an in-plane approach to the hip joint capsule at the femoral head-neck junction. ?     Injection/Aspiration: ?A 22-gauge 3-1/2-inch needle was advanced from an in-plane approach into the hip joint. ?Os was contacted at the femoral head-neck junction. ? After visualization of the needle tip in the target area and negative aspiration for blood, a mixture of 3 cc of 1% lidocaine and 1 cc of Kenalog (40 mg/cc) was injected into the hip joint with excellent sonographic flow. Images of procedure were permanently recorded. Postprocedure Care: ? The patient will avoid soaking the hip under water for two days and avoid heavy exertion with the hip. ?The patient will contact me with any problems related to the injection. PATIENT EDUCATION     Ready to learn, no apparent learning barriers were identified; learning preferences include listening. ? Explained diagnosis and treatment plan; patient

## 2023-09-12 NOTE — PROGRESS NOTES
[facility]  PRIMARY CARE SPORTS MEDICINE  DATE OF VISIT : 2023    Patient : Nitesh Ladd  Age : 62 y.o.  : 1965  MRN : [id number]   ______________________________________________________________________    Chief Complaint :   Chief Complaint   Patient presents with    Injections     Left hip CSI. HPI : Nitesh Ladd is 62 y.o. male who presented to the clinic today for evaluation of ***. Past Medical History :  Past Medical History:   Diagnosis Date    ADHD     Kidney stones     Work related injury 2020    BILAT torn roatator cuffs ,chest contusion      Past Surgical History:   Procedure Laterality Date    COLONOSCOPY      FRACTURE SURGERY      FOOT   age 15  caught his foot in [address] Bilateral     arthroscopy    PELVIC FRACTURE SURGERY Left 2021    LEFT ILLIUM REMOVAL OF HETEROTOPIC OSSIFICATION performed by Dorothy Hou DO at [address] [facility] ARTHROSCOPY Right 2020    rotator cuff repair, subacromial decompression and debridement    SHOULDER ARTHROSCOPY Right 2020    RIGHT SHOULDER ARTHROSCOPY, ROTATOR CUFF REPAIR, SUBACROMIAL DECOMPRESSION (CPT 82945) performed by Sybil Acevedo DO at [address] [facility] ARTHROSCOPY Left 2021    LEFT SHOULDER ARTHROSCOPY SAD DEBRIDEMENT     SHOULDER ARTHROSCOPY Left 2021    LEFT SHOULDER ARTHROSCOPY SAD AND DEBRIDEMENT     SHOULDER ARTHROSCOPY Left 3/19/2021    LEFT SHOULDER ARTHROSCOPY, SUBACROMIAL DECOMPRESSION,  ROTATOR CUFF REPAIR AND DEBRIDEMENT (CPT 53399) (ARTHREX) performed by Sybil Acevedo DO at [address] [facility] [address] : Allergies   Allergen Reactions    Iv Dye [Iodides] Anaphylaxis     Iodine dye       Medication List :    Current Outpatient Medications   Medication Sig Dispense Refill    Misc.  Devices MISC 1 each by Does not apply route once for 1 dose Heating pad 1 each 0    oxyCODONE (ROXICODONE) 20 MG immediate release tablet Take 1 tablet by mouth every 8

## 2023-12-05 DIAGNOSIS — S32.312S: Primary | ICD-10-CM

## 2023-12-07 ENCOUNTER — OFFICE VISIT (OUTPATIENT)
Dept: ORTHOPEDIC SURGERY | Age: 58
End: 2023-12-07
Payer: COMMERCIAL

## 2023-12-07 ENCOUNTER — HOSPITAL ENCOUNTER (OUTPATIENT)
Dept: GENERAL RADIOLOGY | Age: 58
Discharge: HOME | End: 2023-12-09

## 2023-12-07 DIAGNOSIS — S32.312S: ICD-10-CM

## 2023-12-07 DIAGNOSIS — S32.312S: Primary | ICD-10-CM

## 2023-12-07 PROCEDURE — 99212 OFFICE O/P EST SF 10 MIN: CPT

## 2023-12-07 NOTE — PROGRESS NOTES
Chief Complaint   Patient presents with    Follow-up     F/u Left hip and pelvic pain. Pt ambulating w/o assist but has noticeable limp. Pt states 9/10 pain daily. OP:SURGEON: Dr. Romana Door, DO  DATE OF PROCEDURE: 11-17-21  PROCEDURE: Open treatment left ilium avulsion fracture with fragment excision along with excision heterotopic ossification     SUBJECTIVE: Wendy Lopez is a 78-year-old male [address] [place] [address] patient following up from the above surgery. He is WBAT on LLE. Patient still having pain to the left hip. States that is not really the hip joint but the front of the pelvis. Did have recent CSI injection by Dr. Dustin Leon in the middle of September this year which states did nothing with regards to his pain. Still ambulates with a limp but is able to ambulate without assistive device. Has not been in any recent therapy or using any medications for the hip. States if he flexes the hip up and feels much better. He has pain with prolonged walking and more so at night when he lays flat and the hip is straight. Denies any numbness or tingling to the affected extremity. Feels that the pain is right over the anterior aspect of the pelvis where the previous fracture and surgery site was. Patient's currently dealing with his wife who was recently diagnosed with stage IV cancer and [facility]. No other orthopedic complaints this time.     Review of Systems -   General ROS: negative for - chills, fatigue, fever or night sweats  Respiratory ROS: no cough, shortness of breath, or wheezing  Cardiovascular ROS: no chest pain or dyspnea on exertion  Gastrointestinal ROS: no abdominal pain, change in bowel habits, or black or bloody stools  Genitourinary: no hematuria, dysuria, or incontinence   Musculoskeletal ROS:see above  Neurological ROS: no TIA or stroke symptoms     OBJECTIVE:   Alert and oriented X 3, no acute distress, respirations easy and unlabored with no audible

## 2024-01-10 ENCOUNTER — TELEPHONE (OUTPATIENT)
Dept: ORTHOPEDIC SURGERY | Age: 59
End: 2024-01-10

## 2024-01-10 NOTE — TELEPHONE ENCOUNTER
Fern from [facility] called the office and left a voicemail requesting information behalf if the patient is continuing physical therapy and to fax those notes.     I attempted to contact the patient for further information to know how many general he had with [facility]   Phone: [phone]     I left a detailed voicemail to give the office a call to provide that requested information.    I called [facility] this morning and spoke to saji and she stated the patient has not received any PT with them at this time, regarding of form being a month out with a request of a W9. \

## 2024-03-07 ENCOUNTER — HOSPITAL ENCOUNTER (OUTPATIENT)
Dept: GENERAL RADIOLOGY | Age: 59
Discharge: HOME | End: 2024-03-09
Payer: COMMERCIAL

## 2024-03-07 ENCOUNTER — OFFICE VISIT (OUTPATIENT)
Dept: ORTHOPEDIC SURGERY | Age: 59
End: 2024-03-07
Payer: COMMERCIAL

## 2024-03-07 VITALS — WEIGHT: 164.9 LBS | HEIGHT: 68 IN | BODY MASS INDEX: 24.99 KG/M2

## 2024-03-07 DIAGNOSIS — S32.312S: Primary | ICD-10-CM

## 2024-03-07 PROCEDURE — 72190 X-RAY EXAM OF PELVIS: CPT

## 2024-03-07 PROCEDURE — 99212 OFFICE O/P EST SF 10 MIN: CPT

## 2024-03-07 RX ORDER — DULOXETIN HYDROCHLORIDE 30 MG/1
CAPSULE, DELAYED RELEASE ORAL
COMMUNITY
Start: 2024-02-07

## 2024-03-07 NOTE — PROGRESS NOTES
hip extension to this region, this improves with hip flexion  Good passive ROM of hip 40/50 internal and external rotation without pain  Compartments supple throughout thigh and leg  Calf supple and nontender  Demonstrates active knee flexion/extension, ankle plantar/dorsiflexion/great toe extension with no pain.  States sensation intact to touch in sural/deep peroneal/superficial peroneal/saphenous/posterior tibial nerve distributions to foot/ankle.   Palpable dorsalis pedis and posterior tibialis pulses, cap refill brisk in toes, foot warm/perfused.  Negative straight leg raise    Ht 1.727 m (5' 7.99\")   Wt 74.8 kg (164 lb 14.5 oz)   BMI 25.08 kg/m²     XR:   Xr of the pelvis obtained demonstrating no acute fractures or dislocations. Stable irregularity of the lateral left iliac wing. Bilateral hip joint space narrowing.     Assessment:   Diagnosis Orders   1. Closed displaced avulsion fracture of left ilium, sequela            Plan:  Xrays reviewed with patient and Dr. Aguirer  Plan of care discussed in detail, all questions sought and answered to patients satisfaction at this time.   Continue home exercise program.   Continue Voltaren as needed.   Referral to Dr. Maya for evaluation of possible trigger point injection.   Follow up in 3 months   Call if any questions or concerns     [facility] Patient Plan Of Care  New Orders Needing C-9 Authorization: referral to Dr. Maya for trigger point injection  [employer]/Work Status: Continue current work duties  Patient Progressing: Progressing as expected  Patient candidate for Vocational Rehab at this time: No  Patient determined to be MMI at this visit: No

## 2024-03-20 ENCOUNTER — TELEPHONE (OUTPATIENT)
Dept: ORTHOPEDIC SURGERY | Age: 59
End: 2024-03-20

## 2024-03-20 NOTE — TELEPHONE ENCOUNTER
Called patient to schedule appointment with provider.  Call back information was left for the office. C-9 scanned into [employer].

## 2024-06-21 ENCOUNTER — TELEPHONE (OUTPATIENT)
Dept: ORTHOPEDIC SURGERY | Age: 59
End: 2024-06-21

## 2024-06-21 NOTE — TELEPHONE ENCOUNTER
Unable to extend C-9 longer than 2 weeks from end date of 4/19/24.  New C-9 needs to be submitted for trigger point injection, patient phoned into office to schedule on 6/20/24.

## 2024-06-26 NOTE — TELEPHONE ENCOUNTER
New C-9 completed and faxed at this time.     Will await response from [facility].    Electronically signed by Kitty Chaudhary ATC on 6/26/2024 at 4:26 PM

## 2024-06-28 NOTE — TELEPHONE ENCOUNTER
Received voicemail from Mikana with [facility] stating that unfortunately, because the patient waited so long to attempt to have services rendered that the medical necessity is now under question. Per Jessy patient has to be seen by Dr. Aguirre prior to 7/15/24 for documentation of the medical necessity for these injections. Provider availability does not allow for this and patient did not schedule an appointment at last office visit check out. Most likely will have to wait until after next office visit to re-submit for this service.    Forwarding to Dr. Maya staff for notification.    Electronically signed by Kitty Chaudhary ATC on 6/28/2024 at 2:56 PM

## 2024-06-28 NOTE — TELEPHONE ENCOUNTER
Received fax from [facility] with the notice of withdrawal for original C9 dated 3/15/2024. However the new C-9 was pended asking for medical within 30 days of the request.     Call placed to Jessy CARRILLO with [facility] to discuss, as the only reason for the new request was due the inability to have previous C-9 end date extended. Left detailed voicemail requesting a callback.    Electronically signed by Kitty Chaudhary ATC on 6/28/2024 at 12:39 PM

## 2024-09-18 NOTE — TELEPHONE ENCOUNTER
Can be added 9/17 for these new issues. Please keep current appointment on the 25th as well for the continued management of L iliac crest fracture.      Future Appointments   Date Time Provider Maria Guadalupe Bernal   9/25/2020  9:00 AM SCHEDULE, [facility]

## 2024-12-04 ENCOUNTER — TELEPHONE (OUTPATIENT)
Dept: ORTHOPEDIC SURGERY | Age: 59
End: 2024-12-04

## 2024-12-04 NOTE — TELEPHONE ENCOUNTER
The patient called and stated that he was having left hip pain. He stated that it has not gotten any better or worse. His pain management doctor gave him oxyotin which still doesn't help. The patient stated there is no new injury and he is able to ambulate. The patient rates it a 8/10.     Last OV: 03/07/2024 - closed displaced avulsion fracture of left ilium    Sent to provider for further recommendations

## 2024-12-04 NOTE — TELEPHONE ENCOUNTER
Spoke to the patient and scheduled appointment.Gave instructions and directions  Future Appointments   Date Time Provider Department Center   1/9/2025  2:00 PM Mian Aguirer DO [facility]

## 2025-01-07 DIAGNOSIS — S32.312S: Primary | ICD-10-CM

## 2025-04-03 ENCOUNTER — TELEPHONE (OUTPATIENT)
Dept: ORTHOPEDIC SURGERY | Age: 60
End: 2025-04-03

## 2025-04-03 NOTE — TELEPHONE ENCOUNTER
[facility] records for 03/31/25 received and scanned. Pt was seen for kidney stone on 03/27/25 and neck pain on 03/31/25, nothing related to hip. When I questioned patient about this, he reports he has a lot of problems but wants to be seen for his LT hip pain.

## 2025-04-03 NOTE — TELEPHONE ENCOUNTER
Future Appointments   Date Time Provider Department Center   5/29/2025  9:30 AM Mian Aguirre DO [facility]

## 2025-05-29 ENCOUNTER — OFFICE VISIT (OUTPATIENT)
Age: 60
End: 2025-05-29

## 2025-05-29 ENCOUNTER — HOSPITAL ENCOUNTER (OUTPATIENT)
Dept: GENERAL RADIOLOGY | Age: 60
Discharge: HOME | End: 2025-05-31
Payer: COMMERCIAL

## 2025-05-29 VITALS — DIASTOLIC BLOOD PRESSURE: 90 MMHG | SYSTOLIC BLOOD PRESSURE: 146 MMHG | HEART RATE: 62 BPM | OXYGEN SATURATION: 96 %

## 2025-05-29 DIAGNOSIS — S32.312S: ICD-10-CM

## 2025-05-29 DIAGNOSIS — M16.11 PRIMARY OSTEOARTHRITIS OF RIGHT HIP: Primary | ICD-10-CM

## 2025-05-29 DIAGNOSIS — M16.12 PRIMARY OSTEOARTHRITIS OF LEFT HIP: ICD-10-CM

## 2025-05-29 PROCEDURE — 72190 X-RAY EXAM OF PELVIS: CPT

## 2025-05-29 RX ORDER — BUSPIRONE HYDROCHLORIDE 5 MG/1
5 TABLET ORAL 2 TIMES DAILY
COMMUNITY
Start: 2025-03-27

## 2025-05-29 RX ORDER — CARISOPRODOL 350 MG/1
350 TABLET ORAL 3 TIMES DAILY PRN
COMMUNITY
Start: 2025-04-14

## 2025-05-29 NOTE — PROGRESS NOTES
Ortho Clinic Note    Chief Complaint   Patient presents with    workers comp     Pt is here for increased L hip/pelvic pain. The pt has hx open tx L ilium avulsion fx. The patient ambulates with cane. The patient states constant pain 8/10.       OP:SURGEON: Dr. Mian Aguirre DO  DATE OF PROCEDURE: 11-17-21  PROCEDURE: Open treatment left ilium avulsion fracture with fragment excision along with excision heterotopic ossification     SUBJECTIVE: Angel Pacheco is a 57-year-old male here for evaluation for his pelvis and hips.  Patient states has been having severe pain to the bilateral groin regions left slightly worse than right.  States it feels as somebody sitting on the front of his pelvis.  Did have previous surgical procedure for fragment excision of a pelvic avulsion fracture however this pain is different.  Patient also states she is not working and on temporary disability due to mental issues.  He uses a cane for ambulation.  We did try and work patient up in the past for his hip osteoarthritis and he did have a CSI injection back in September of last year which she did not feel gave him any significant relief.   Still ambulates with a limp but is able to ambulate without assistive device.  Has not been in any recent therapy or using any medications for the hip.  More recently has been treated at the [facility] for kidney stones and states has an upcoming procedure.  Also has been worked up for his neck and low back.  Denies any numbness or tingling to the affected extremity.   No other orthopedic complaints this time.  Denies any recent illnesses or infections.  Denies any fevers or chills.    Review of Systems -   General ROS: negative for - chills, fatigue, fever or night sweats  Respiratory ROS: no cough, shortness of breath, or wheezing  Cardiovascular ROS: no chest pain or dyspnea on exertion  Gastrointestinal ROS: no abdominal pain, change in bowel habits, or black or bloody stools  Genitourinary: no

## (undated) DEVICE — CLOTH SURG PREP PREOPERATIVE CHLORHEXIDINE GLUC 2% READYPREP

## (undated) DEVICE — GARMENT COMPR STD FOR 17IN CALF UNIF THER FLOTRN

## (undated) DEVICE — NEEDLE SPNL L3.5IN PNK HUB S STL REG WALL FIT STYL W/ QNCKE

## (undated) DEVICE — Device

## (undated) DEVICE — DRAPE EQUIP CARM 72X42 IN RUBBER BND CLP

## (undated) DEVICE — STANDARD HYPODERMIC NEEDLE,POLYPROPYLENE HUB: Brand: MONOJECT

## (undated) DEVICE — GLOVE ORANGE PI 8   MSG9080

## (undated) DEVICE — CART DISP LID TRANSPOSAL

## (undated) DEVICE — BLADE CLIPPER GEN PURP NS

## (undated) DEVICE — PROBE ABLAT 90DEG ASPIR MULTIPORT BPLR RF 1 PC ELECTRD ERGO

## (undated) DEVICE — Z CONVERTED USE 2275207 CLOTH PREP W7.5XL7.5IN 2% CHG SKIN ALC AND RNS FREE

## (undated) DEVICE — SOLUTION IV IRRIG POUR BRL 0.9% SODIUM CHL 2F7124

## (undated) DEVICE — MEDI-VAC NON-CONDUCTIVE SUCTION TUBING: Brand: CARDINAL HEALTH

## (undated) DEVICE — DRESSING GZ XRFRM 4X4(25/BX 6BX/CS)

## (undated) DEVICE — SUTURE FIBERLINK SZ 2-0 L26IN NONABSORBABLE BLU CLS LOOP AR7235

## (undated) DEVICE — GAUZE,SPONGE,4"X4",16PLY,STRL,LF,10/TRAY: Brand: MEDLINE

## (undated) DEVICE — 3M™ MEDIPORE™ SOFT CLOTH TAPE, 4 INCH X 10 YARDS, 12 ROLLS/CASE, 2964: Brand: 3M™ MEDIPORE™

## (undated) DEVICE — SET ORTHO STD STORTSTD1

## (undated) DEVICE — DRAPE,SHOULDER,ORTHOMAX,W/POUCH,5/CS: Brand: MEDLINE

## (undated) DEVICE — 3M™ STERI-DRAPE™ U-DRAPE 1015: Brand: STERI-DRAPE™

## (undated) DEVICE — BLADE SHV L13CM DIA4MM DBL CUT COOLCUT

## (undated) DEVICE — 3M™ STERI-DRAPE™ U-DRAPE, LONG 1019: Brand: STERI-DRAPE™

## (undated) DEVICE — GLOVE SURG SZ 8 L12IN FNGR THK94MIL STD WHT LTX FREE

## (undated) DEVICE — 3M™ IOBAN™ 2 ANTIMICROBIAL INCISE DRAPE 6650EZ: Brand: IOBAN™ 2

## (undated) DEVICE — 5-IN-1 BARBED CONNECTOR POLYPROPYLENE 3/16 - 9/16 IN. (5 - 14.3 MM): Brand: ARGYLE

## (undated) DEVICE — SURGICAL PROCEDURE PACK TRAUM

## (undated) DEVICE — NEEDLE HYPO 18GA L1.5IN PNK POLYPR HUB S STL THN WALL FILL

## (undated) DEVICE — 3M™ IOBAN™ 2 ANTIMICROBIAL INCISE DRAPE 6640EZ: Brand: IOBAN™ 2

## (undated) DEVICE — PEN: MARKING STD 100/CS: Brand: MEDICAL ACTION INDUSTRIES

## (undated) DEVICE — NEEDLE REPROC SCORPION MULTIFIRE

## (undated) DEVICE — SOLUTION IRRIG 1000ML 09% SOD CHL USP PIC PLAS CONTAINER

## (undated) DEVICE — CANNULA ARTHSCP L3CM ID8MM DBL DAM 1 PC MOLD LO PROF FLNG

## (undated) DEVICE — APPLICATOR MEDICATED 26 CC SOLUTION HI LT ORNG CHLORAPREP

## (undated) DEVICE — BASIC PACK: Brand: CONVERTORS

## (undated) DEVICE — GOWN,BREATHABLE SLV,AURORA,XLG,STRL: Brand: MEDLINE

## (undated) DEVICE — GOWN SURG XL LNG LEN SPUNBOND REINF VELC TIE LEV 4 IMPERV

## (undated) DEVICE — DOUBLE BASIN SET: Brand: MEDLINE INDUSTRIES, INC.

## (undated) DEVICE — 4-PORT MANIFOLD: Brand: NEPTUNE 2

## (undated) DEVICE — Z DISCONTINUED PER MEDLINE USE 2741942 DRESSING AQUACEL 6 IN ALG W9XL15CM SIL CVR WTRPRF VIR BACT BARR ANTIMIC

## (undated) DEVICE — SUTURE PROL SZ 3-0 L18IN NONABSORBABLE BLU L19MM PS-2 3/8 8687H

## (undated) DEVICE — ELECTRODE PT RET AD L9FT HI MOIST COND ADH HYDRGEL CORDED

## (undated) DEVICE — DRAPE SURG W88XL116IN SMS BODY SPL ORTH N FEN REINF FLD PCH

## (undated) DEVICE — CHLORAPREP 26ML ORANGE

## (undated) DEVICE — TOWEL OR BLUEE 16X26IN ST 8 PACK ORB08 16X26ORTWL

## (undated) DEVICE — SLEEVE TRAC SPANDEX LAT W/ 4IN COBAN SUPERFICIAL RAD NRV PD

## (undated) DEVICE — SET ORTHO STD STORTSTD2

## (undated) DEVICE — BUR SHV L13CM DIA5.5MM 8 FLUT OVL CLEARCUT COOLCUT

## (undated) DEVICE — INTENDED FOR TISSUE SEPARATION, AND OTHER PROCEDURES THAT REQUIRE A SHARP SURGICAL BLADE TO PUNCTURE OR CUT.: Brand: BARD-PARKER ® STAINLESS STEEL BLADES

## (undated) DEVICE — GAUZE,SPONGE,4"X4",16PLY,XRAY,STRL,LF: Brand: MEDLINE

## (undated) DEVICE — SOLUTION SURG PREP ANTIMICROBIAL 4 OZ SKIN WND EXIDINE

## (undated) DEVICE — GLOVE ORTHO 8   MSG9480

## (undated) DEVICE — TUBING PMP L16FT MAIN DISP FOR AR-6400 AR-6475

## (undated) DEVICE — SLING ARM 9 1/2X20IN L MULTIPAK

## (undated) DEVICE — 1000 S-DRAPE TOWEL DRAPE 10/BX: Brand: STERI-DRAPE™

## (undated) DEVICE — 1810 FOAM BLOCK NEEDLE COUNTER: Brand: DEVON

## (undated) DEVICE — SPONGE LAP W18XL18IN WHT COT 4 PLY FLD STRUNG RADPQ DISP ST

## (undated) DEVICE — 3M™ COBAN™ NL STERILE NON-LATEX SELF-ADHERENT WRAP, 2084S, 4 IN X 5 YD (10 CM X 4,5 M), 18 ROLLS/CASE: Brand: 3M™ COBAN™

## (undated) DEVICE — SUTURE SUTTAPE FIBERLINK 1.3MM WHT BLU CLS LOOP AR7535

## (undated) DEVICE — CONVERTORS STOCKINETTE: Brand: CONVERTORS

## (undated) DEVICE — SYRINGE MED 10ML LUERLOCK TIP W/O SFTY DISP